# Patient Record
Sex: FEMALE | Race: WHITE | Employment: UNEMPLOYED | URBAN - METROPOLITAN AREA
[De-identification: names, ages, dates, MRNs, and addresses within clinical notes are randomized per-mention and may not be internally consistent; named-entity substitution may affect disease eponyms.]

---

## 2021-04-24 ENCOUNTER — HOSPITAL ENCOUNTER (INPATIENT)
Age: 81
LOS: 3 days | Discharge: NURSING, REHAB OR LONG-TERM CARE | DRG: 690 | End: 2021-04-27
Attending: STUDENT IN AN ORGANIZED HEALTH CARE EDUCATION/TRAINING PROGRAM | Admitting: NEUROMUSCULOSKELETAL MEDICINE & OMM
Payer: MEDICARE

## 2021-04-24 ENCOUNTER — APPOINTMENT (OUTPATIENT)
Dept: CT IMAGING | Age: 81
DRG: 690 | End: 2021-04-24
Payer: MEDICARE

## 2021-04-24 DIAGNOSIS — N30.00 ACUTE CYSTITIS WITHOUT HEMATURIA: ICD-10-CM

## 2021-04-24 DIAGNOSIS — I95.9 HYPOTENSION, UNSPECIFIED HYPOTENSION TYPE: ICD-10-CM

## 2021-04-24 DIAGNOSIS — R41.82 ALTERED MENTAL STATUS, UNSPECIFIED ALTERED MENTAL STATUS TYPE: Primary | ICD-10-CM

## 2021-04-24 PROBLEM — N30.90 CYSTITIS: Status: ACTIVE | Noted: 2021-04-24

## 2021-04-24 LAB
ALBUMIN SERPL-MCNC: 3.2 G/DL (ref 3.5–5.2)
ALP BLD-CCNC: 108 U/L (ref 35–104)
ALT SERPL-CCNC: 13 U/L (ref 0–32)
ANION GAP SERPL CALCULATED.3IONS-SCNC: 9 MMOL/L (ref 7–16)
AST SERPL-CCNC: 28 U/L (ref 0–31)
BACTERIA: ABNORMAL /HPF
BASOPHILS ABSOLUTE: 0.02 E9/L (ref 0–0.2)
BASOPHILS RELATIVE PERCENT: 0.4 % (ref 0–2)
BILIRUB SERPL-MCNC: <0.2 MG/DL (ref 0–1.2)
BILIRUBIN URINE: NEGATIVE
BLOOD, URINE: ABNORMAL
BUN BLDV-MCNC: 20 MG/DL (ref 6–23)
CALCIUM SERPL-MCNC: 8.6 MG/DL (ref 8.6–10.2)
CHLORIDE BLD-SCNC: 103 MMOL/L (ref 98–107)
CLARITY: ABNORMAL
CO2: 24 MMOL/L (ref 22–29)
COLOR: YELLOW
CREAT SERPL-MCNC: 0.7 MG/DL (ref 0.5–1)
EOSINOPHILS ABSOLUTE: 0.17 E9/L (ref 0.05–0.5)
EOSINOPHILS RELATIVE PERCENT: 3.3 % (ref 0–6)
EPITHELIAL CELLS, UA: ABNORMAL /HPF
GFR AFRICAN AMERICAN: >60
GFR NON-AFRICAN AMERICAN: >60 ML/MIN/1.73
GLUCOSE BLD-MCNC: 84 MG/DL (ref 74–99)
GLUCOSE URINE: NEGATIVE MG/DL
HCT VFR BLD CALC: 37.5 % (ref 34–48)
HEMOGLOBIN: 11.7 G/DL (ref 11.5–15.5)
IMMATURE GRANULOCYTES #: 0.01 E9/L
IMMATURE GRANULOCYTES %: 0.2 % (ref 0–5)
KETONES, URINE: NEGATIVE MG/DL
LEUKOCYTE ESTERASE, URINE: ABNORMAL
LYMPHOCYTES ABSOLUTE: 1.82 E9/L (ref 1.5–4)
LYMPHOCYTES RELATIVE PERCENT: 35.5 % (ref 20–42)
MCH RBC QN AUTO: 32.7 PG (ref 26–35)
MCHC RBC AUTO-ENTMCNC: 31.2 % (ref 32–34.5)
MCV RBC AUTO: 104.7 FL (ref 80–99.9)
MONOCYTES ABSOLUTE: 0.48 E9/L (ref 0.1–0.95)
MONOCYTES RELATIVE PERCENT: 9.4 % (ref 2–12)
NEUTROPHILS ABSOLUTE: 2.62 E9/L (ref 1.8–7.3)
NEUTROPHILS RELATIVE PERCENT: 51.2 % (ref 43–80)
NITRITE, URINE: NEGATIVE
PDW BLD-RTO: 14.5 FL (ref 11.5–15)
PH UA: 6 (ref 5–9)
PLATELET # BLD: 200 E9/L (ref 130–450)
PMV BLD AUTO: 12.2 FL (ref 7–12)
POTASSIUM SERPL-SCNC: 5.5 MMOL/L (ref 3.5–5)
PROCALCITONIN: 0.06 NG/ML (ref 0–0.08)
PROTEIN UA: 30 MG/DL
RBC # BLD: 3.58 E12/L (ref 3.5–5.5)
RBC UA: ABNORMAL /HPF (ref 0–2)
SODIUM BLD-SCNC: 136 MMOL/L (ref 132–146)
SPECIFIC GRAVITY UA: 1.01 (ref 1–1.03)
TOTAL PROTEIN: 6.7 G/DL (ref 6.4–8.3)
TROPONIN: <0.01 NG/ML (ref 0–0.03)
UROBILINOGEN, URINE: 0.2 E.U./DL
WBC # BLD: 5.1 E9/L (ref 4.5–11.5)
WBC UA: ABNORMAL /HPF (ref 0–5)

## 2021-04-24 PROCEDURE — 96360 HYDRATION IV INFUSION INIT: CPT

## 2021-04-24 PROCEDURE — 87088 URINE BACTERIA CULTURE: CPT

## 2021-04-24 PROCEDURE — 87040 BLOOD CULTURE FOR BACTERIA: CPT

## 2021-04-24 PROCEDURE — 87077 CULTURE AEROBIC IDENTIFY: CPT

## 2021-04-24 PROCEDURE — 36415 COLL VENOUS BLD VENIPUNCTURE: CPT

## 2021-04-24 PROCEDURE — 99284 EMERGENCY DEPT VISIT MOD MDM: CPT

## 2021-04-24 PROCEDURE — G0378 HOSPITAL OBSERVATION PER HR: HCPCS | Performed by: NEUROMUSCULOSKELETAL MEDICINE & OMM

## 2021-04-24 PROCEDURE — 85025 COMPLETE CBC W/AUTO DIFF WBC: CPT

## 2021-04-24 PROCEDURE — 2580000003 HC RX 258: Performed by: NEUROMUSCULOSKELETAL MEDICINE & OMM

## 2021-04-24 PROCEDURE — 6370000000 HC RX 637 (ALT 250 FOR IP): Performed by: NEUROMUSCULOSKELETAL MEDICINE & OMM

## 2021-04-24 PROCEDURE — 2580000003 HC RX 258: Performed by: STUDENT IN AN ORGANIZED HEALTH CARE EDUCATION/TRAINING PROGRAM

## 2021-04-24 PROCEDURE — 87186 SC STD MICRODIL/AGAR DIL: CPT

## 2021-04-24 PROCEDURE — 74176 CT ABD & PELVIS W/O CONTRAST: CPT

## 2021-04-24 PROCEDURE — 81001 URINALYSIS AUTO W/SCOPE: CPT

## 2021-04-24 PROCEDURE — G0378 HOSPITAL OBSERVATION PER HR: HCPCS

## 2021-04-24 PROCEDURE — 6360000002 HC RX W HCPCS: Performed by: STUDENT IN AN ORGANIZED HEALTH CARE EDUCATION/TRAINING PROGRAM

## 2021-04-24 PROCEDURE — 84484 ASSAY OF TROPONIN QUANT: CPT

## 2021-04-24 PROCEDURE — 96361 HYDRATE IV INFUSION ADD-ON: CPT

## 2021-04-24 PROCEDURE — 96374 THER/PROPH/DIAG INJ IV PUSH: CPT

## 2021-04-24 PROCEDURE — 93005 ELECTROCARDIOGRAM TRACING: CPT | Performed by: STUDENT IN AN ORGANIZED HEALTH CARE EDUCATION/TRAINING PROGRAM

## 2021-04-24 PROCEDURE — 96365 THER/PROPH/DIAG IV INF INIT: CPT

## 2021-04-24 PROCEDURE — 80053 COMPREHEN METABOLIC PANEL: CPT

## 2021-04-24 PROCEDURE — 84145 PROCALCITONIN (PCT): CPT

## 2021-04-24 RX ORDER — SODIUM CHLORIDE 9 MG/ML
INJECTION, SOLUTION INTRAVENOUS CONTINUOUS
Status: DISCONTINUED | OUTPATIENT
Start: 2021-04-24 | End: 2021-04-27 | Stop reason: HOSPADM

## 2021-04-24 RX ORDER — 0.9 % SODIUM CHLORIDE 0.9 %
500 INTRAVENOUS SOLUTION INTRAVENOUS ONCE
Status: COMPLETED | OUTPATIENT
Start: 2021-04-24 | End: 2021-04-24

## 2021-04-24 RX ORDER — ACETAMINOPHEN 650 MG/1
650 SUPPOSITORY RECTAL EVERY 4 HOURS PRN
Status: DISCONTINUED | OUTPATIENT
Start: 2021-04-24 | End: 2021-04-27 | Stop reason: HOSPADM

## 2021-04-24 RX ORDER — ACETAMINOPHEN 160 MG/5ML
650 SOLUTION ORAL EVERY 4 HOURS PRN
Status: DISCONTINUED | OUTPATIENT
Start: 2021-04-24 | End: 2021-04-27 | Stop reason: HOSPADM

## 2021-04-24 RX ORDER — CEFDINIR 300 MG/1
300 CAPSULE ORAL 2 TIMES DAILY
Qty: 20 CAPSULE | Refills: 0 | Status: SHIPPED | OUTPATIENT
Start: 2021-04-24 | End: 2021-05-04

## 2021-04-24 RX ORDER — 0.9 % SODIUM CHLORIDE 0.9 %
1000 INTRAVENOUS SOLUTION INTRAVENOUS ONCE
Status: COMPLETED | OUTPATIENT
Start: 2021-04-24 | End: 2021-04-24

## 2021-04-24 RX ORDER — MIRTAZAPINE 15 MG/1
15 TABLET, FILM COATED ORAL NIGHTLY
Status: DISCONTINUED | OUTPATIENT
Start: 2021-04-24 | End: 2021-04-27 | Stop reason: HOSPADM

## 2021-04-24 RX ORDER — ACETAMINOPHEN 160 MG/5ML
650 SOLUTION ORAL EVERY 4 HOURS PRN
COMMUNITY

## 2021-04-24 RX ORDER — CARBAMAZEPINE 100 MG/5ML
600 SUSPENSION ORAL 3 TIMES DAILY
COMMUNITY

## 2021-04-24 RX ORDER — MIRTAZAPINE 15 MG/1
15 TABLET, FILM COATED ORAL NIGHTLY
COMMUNITY

## 2021-04-24 RX ORDER — CARBAMAZEPINE 100 MG/5ML
600 SUSPENSION ORAL 3 TIMES DAILY
Status: DISCONTINUED | OUTPATIENT
Start: 2021-04-24 | End: 2021-04-27 | Stop reason: HOSPADM

## 2021-04-24 RX ORDER — ACETAMINOPHEN 650 MG/1
650 SUPPOSITORY RECTAL EVERY 4 HOURS PRN
COMMUNITY

## 2021-04-24 RX ORDER — BISACODYL 10 MG
10 SUPPOSITORY, RECTAL RECTAL DAILY PRN
Status: DISCONTINUED | OUTPATIENT
Start: 2021-04-24 | End: 2021-04-27 | Stop reason: HOSPADM

## 2021-04-24 RX ORDER — PRIMIDONE 50 MG/1
100 TABLET ORAL 3 TIMES DAILY
Status: DISCONTINUED | OUTPATIENT
Start: 2021-04-24 | End: 2021-04-27 | Stop reason: HOSPADM

## 2021-04-24 RX ORDER — BISACODYL 10 MG
10 SUPPOSITORY, RECTAL RECTAL DAILY PRN
COMMUNITY

## 2021-04-24 RX ADMIN — SODIUM CHLORIDE 500 ML: 9 INJECTION, SOLUTION INTRAVENOUS at 06:42

## 2021-04-24 RX ADMIN — SODIUM CHLORIDE 1000 ML: 9 INJECTION, SOLUTION INTRAVENOUS at 09:45

## 2021-04-24 RX ADMIN — PRIMIDONE 100 MG: 50 TABLET ORAL at 15:18

## 2021-04-24 RX ADMIN — CARBAMAZEPINE 600 MG: 200 SUSPENSION ORAL at 20:12

## 2021-04-24 RX ADMIN — SODIUM CHLORIDE: 9 INJECTION, SOLUTION INTRAVENOUS at 14:21

## 2021-04-24 RX ADMIN — MIRTAZAPINE 15 MG: 15 TABLET, FILM COATED ORAL at 20:11

## 2021-04-24 RX ADMIN — CARBAMAZEPINE 600 MG: 200 SUSPENSION ORAL at 15:18

## 2021-04-24 RX ADMIN — CEFTRIAXONE 1000 MG: 1 INJECTION, POWDER, FOR SOLUTION INTRAMUSCULAR; INTRAVENOUS at 10:57

## 2021-04-24 RX ADMIN — PRIMIDONE 100 MG: 50 TABLET ORAL at 20:11

## 2021-04-24 NOTE — ED NOTES
[facility] reports that she is not pt's nurse but patient is nonverbal and will pull away when touched. This morning aides were making rounds and found patient \"unresponsive\" for 4-5 minutes despite being sternal rubbed.   Facility physician wanted patient sent in for \"evaluation of possible stroke or seizure like activity\"     Will fax code status paperwork to us ([phone])     Neeta Dominguez RN  04/24/21 6480

## 2021-04-24 NOTE — ED PROVIDER NOTES
Patient is a 79-year-old female with history of cerebral palsy, previous CVA, epilepsy that presents emergency department for evaluation of altered mental status. Patient from nursing home. She reportedly had an unresponsive episode yesterday that lasted 4 to 5 minutes. Symptoms came on suddenly. They were constant and severe. They did resolve on their own without any treatment. They noted at that time that her blood pressure was low. Physician from nursing home decided sent patient in for evaluation of stroke vs seizure. Patient is noted to be a DNR CC. She is nonverbal and unable provide further history. The history is provided by the patient. Review of Systems   Unable to perform ROS: Patient nonverbal        Physical Exam  Vitals signs and nursing note reviewed. Constitutional:       General: She is not in acute distress. Appearance: She is not ill-appearing. HENT:      Head: Normocephalic and atraumatic. Mouth/Throat:      Mouth: Mucous membranes are moist.   Eyes:      Pupils: Pupils are equal, round, and reactive to light. Neck:      Musculoskeletal: No muscular tenderness. Cardiovascular:      Rate and Rhythm: Normal rate and regular rhythm. Pulses: Normal pulses. Pulmonary:      Effort: Pulmonary effort is normal.      Breath sounds: Normal breath sounds. No wheezing or rhonchi. Abdominal:      Palpations: Abdomen is soft. Tenderness: There is no abdominal tenderness. There is no guarding or rebound. Musculoskeletal:      Right lower leg: No edema. Left lower leg: No edema. Skin:     General: Skin is warm and dry. Neurological:      Mental Status: She is alert. Mental status is at baseline. GCS: GCS eye subscore is 4. GCS verbal subscore is 1. GCS motor subscore is 5. Comments: Patient arms and legs contracted which is baseline.           Procedures     MDM  Number of Diagnoses or Management Options  Acute cystitis without

## 2021-04-24 NOTE — CONSULTS
E.E.S. [Erythromycin]     Tagamet [Cimetidine]        Surgical History  Unable to obtain due to patient's cognitive condition     Social History  Unable to obtain due to patient's cognitive condition      Family Medical History  Unable to obtain due to patient's cognitive condition    Review of Systems:  Unable to obtain due to patient's cognitive condition    Physical Examination:  Vitals:    04/24/21 1015 04/24/21 1132 04/24/21 1215 04/24/21 1545   BP: (!) 84/57 97/69 105/88 (!) 116/58   Pulse: 64 61 77 72   Resp: 18 18 18 18   Temp: 97.9 °F (36.6 °C)  98.7 °F (37.1 °C) 98.3 °F (36.8 °C)   TempSrc:   Temporal Temporal   SpO2: 94% 96% 98% 97%   Weight:   104 lb (47.2 kg)    Height:   5' (1.524 m)      Constitutional: Awake, not in distress  Eyes: Sclerae anicteric, no conjunctival erythema  ENT: No buccal lesion  Neck: No nuchal rigidity, no cervical adenopathy  Lungs: Clear breath sounds, no crackles, no wheezes  Heart: Regular rate and rhythm, no murmurs  Abdomen: Bowel sounds present, soft, nontender  Skin: Warm and dry, no active dermatoses  Musculoskeletal: No joint erythema, no joint swelling    Labs, imaging, and medical records/notes were personally reviewed. Assessment:  Hypotension, etiology unclear  Bacteriuria    Plan:  Continue ceftriaxone 1g q24h for now. Check blood cultures. Check CT abdomen and pelvis. Follow up urine culture. Check procalcitonin. Continue supportive care. Thank you for involving me in the care of NADEEN HOLGUIN. I will continue to follow. Please do not hesitate to call for any questions or concerns.     Electronically signed by Harjinder Rodriguez MD on 4/24/2021 at 5:39 PM

## 2021-04-24 NOTE — LETTER
[place] Department Medicaid  CERTIFICATION OF NECESSITY  FOR NON-EMERGENCY TRANSPORTATION   BY GROUND AMBULANCE      Individual Information   1. Name: Adeel Lofton 2. [place] Medicaid Billing Number:    3. Address: [facility] [address]      Transportation Provider Information   4. Provider Name:    5. [place] Medicaid Provider Number:  National Provider Identifier (NPI):      Certification  7. Criteria:  During transport, this individual requires:  [x] Medical treatment or continuous     supervision by an EMT. [] The administration or regulation of oxygen by another person. [] Supervised protective restraint. 8. Period Beginning Date: 2021   9. Length  [x] Not more than 7 day(s)  [] One Year     Additional Information Relevant to Certification   10. Comments or Explanations, If Necessary or Appropriate   CEREBRAL PALSY, HIGH FALL RISK, CONTRACTURES     Certifying Practitioner Information   11. Name of Practitioner: DR. Dominique HILL MD   12. [place] Medicaid Provider Number, If Applicable:  [address] Provider Identifier (NPI):      Signature Information   14. Date of Signature: 2021   15. Name of Person Signing: Irlanda Wilson   16. Signature and Professional Designation: Electronically signed by RUTH Bender on 2021 at 2:12 PM       OD 78233  Rev. 2015       [address] [place] [address] Encounter Date/Time: 2021 Amberly Dill 336 Account: [de-identified]    MRN: [id number]    Patient: Adeel Lofton    Contact Serial #: [id number]      ENCOUNTER          Patient Class: I Private Enc?   No Unit  BD: [facility] Service: Med/Surg   Encounter DX: Acute cystitis without h*   ADM Provider: Kamari Fraser MD   Procedure:     ATT Provider: Kamari Fraser MD   REF Provider:        Admission DX: Acute cystitis without hematuria, Cystitis and codes: 595.0, 595.9      PATIENT                 Name: Adeel Lofton : 1940 (81 yrs)   Address: [address] [place] [address] Sex: Female   [place] [address]         Marital Status: Single   Employer: NONE         Shinto:     Primary Care Provider:           Primary Phone: [phone]   EMERGENCY CONTACT   Contact Name Legal Guardian? Relationship to Patient Home Phone Work Phone   1. Divina lowerylarryeloisa Mayen  2. Carlton Starkey No  No Legal Guardian  Brother/Sister [phone]              GUARANTOR            Guarantor: Tammy Arteaga     : 1940   Address: [address]* Sex: Female   [place] [address]     Relation to Patient: Self       Home Phone: [phone]   Guarantor ID: [id number]       Work Phone:     Guarantor Employer: NONE         Status: NOT EMPLO*      COVERAGE        PRIMARY INSURANCE   Payor: Wes Gamma* Plan: Ulises BARROS*   Payor Address: [facility], [address] [facility]       Group Number: [id number] Insurance Type: INDEMNITY   Subscriber Name: Josh Santillan : 1940   Subscriber ID: [id number] Pat. Rel. to Sub: Self   SECONDARY INSURANCE   Payor:   Plan:     Payor Address:  ,           Group Number:   Insurance Type:     Subscriber Name:   Subscriber :     Subscriber ID:   Pat.  Rel. to Sub:

## 2021-04-24 NOTE — PLAN OF CARE
Problem: Falls - Risk of:  Goal: Will remain free from falls  Description: Will remain free from falls  4/24/2021 1556 by Janice Cortez  Outcome: Met This Shift  4/24/2021 1310 by Janice Cortez  Outcome: Met This Shift  Goal: Absence of physical injury  Description: Absence of physical injury  4/24/2021 1556 by Janice Cortez  Outcome: Met This Shift  4/24/2021 1310 by Janice Cortez  Outcome: Met This Shift

## 2021-04-25 LAB
ALBUMIN SERPL-MCNC: 2.6 G/DL (ref 3.5–5.2)
ALP BLD-CCNC: 90 U/L (ref 35–104)
ALT SERPL-CCNC: 7 U/L (ref 0–32)
ANION GAP SERPL CALCULATED.3IONS-SCNC: 9 MMOL/L (ref 7–16)
AST SERPL-CCNC: 12 U/L (ref 0–31)
BILIRUB SERPL-MCNC: <0.2 MG/DL (ref 0–1.2)
BUN BLDV-MCNC: 13 MG/DL (ref 6–23)
CALCIUM SERPL-MCNC: 7.6 MG/DL (ref 8.6–10.2)
CHLORIDE BLD-SCNC: 108 MMOL/L (ref 98–107)
CO2: 26 MMOL/L (ref 22–29)
CREAT SERPL-MCNC: 0.6 MG/DL (ref 0.5–1)
EKG ATRIAL RATE: 73 BPM
EKG Q-T INTERVAL: 402 MS
EKG QRS DURATION: 86 MS
EKG QTC CALCULATION (BAZETT): 442 MS
EKG R AXIS: 19 DEGREES
EKG T AXIS: 87 DEGREES
EKG VENTRICULAR RATE: 73 BPM
GFR AFRICAN AMERICAN: >60
GFR NON-AFRICAN AMERICAN: >60 ML/MIN/1.73
GLUCOSE BLD-MCNC: 105 MG/DL (ref 74–99)
POTASSIUM SERPL-SCNC: 4.2 MMOL/L (ref 3.5–5)
SODIUM BLD-SCNC: 143 MMOL/L (ref 132–146)
TOTAL PROTEIN: 5.2 G/DL (ref 6.4–8.3)

## 2021-04-25 PROCEDURE — 96376 TX/PRO/DX INJ SAME DRUG ADON: CPT

## 2021-04-25 PROCEDURE — 6360000002 HC RX W HCPCS: Performed by: NEUROMUSCULOSKELETAL MEDICINE & OMM

## 2021-04-25 PROCEDURE — 1200000000 HC SEMI PRIVATE

## 2021-04-25 PROCEDURE — 36415 COLL VENOUS BLD VENIPUNCTURE: CPT

## 2021-04-25 PROCEDURE — 6370000000 HC RX 637 (ALT 250 FOR IP): Performed by: NEUROMUSCULOSKELETAL MEDICINE & OMM

## 2021-04-25 PROCEDURE — 2580000003 HC RX 258: Performed by: NEUROMUSCULOSKELETAL MEDICINE & OMM

## 2021-04-25 PROCEDURE — 93010 ELECTROCARDIOGRAM REPORT: CPT | Performed by: INTERNAL MEDICINE

## 2021-04-25 PROCEDURE — 80053 COMPREHEN METABOLIC PANEL: CPT

## 2021-04-25 PROCEDURE — 99222 1ST HOSP IP/OBS MODERATE 55: CPT | Performed by: INTERNAL MEDICINE

## 2021-04-25 RX ORDER — 0.9 % SODIUM CHLORIDE 0.9 %
250 INTRAVENOUS SOLUTION INTRAVENOUS ONCE
Status: DISCONTINUED | OUTPATIENT
Start: 2021-04-25 | End: 2021-04-27 | Stop reason: HOSPADM

## 2021-04-25 RX ADMIN — CARBAMAZEPINE 600 MG: 200 SUSPENSION ORAL at 14:43

## 2021-04-25 RX ADMIN — CARBAMAZEPINE 600 MG: 200 SUSPENSION ORAL at 08:40

## 2021-04-25 RX ADMIN — PRIMIDONE 100 MG: 50 TABLET ORAL at 14:43

## 2021-04-25 RX ADMIN — CEFTRIAXONE 1000 MG: 1 INJECTION, POWDER, FOR SOLUTION INTRAMUSCULAR; INTRAVENOUS at 12:22

## 2021-04-25 RX ADMIN — PRIMIDONE 100 MG: 50 TABLET ORAL at 08:40

## 2021-04-25 ASSESSMENT — PAIN SCALES - WONG BAKER
WONGBAKER_NUMERICALRESPONSE: 0
WONGBAKER_NUMERICALRESPONSE: 0

## 2021-04-25 NOTE — H&P
[address]                  [address] [facility]                              HISTORY AND PHYSICAL    PATIENT NAME: Vincent Gomez                      :        1940  MED REC NO:   [id number]                            ROOM:       5414  ACCOUNT NO:   [de-identified]                           ADMIT DATE: 2021  PROVIDER:     Taty Mcduffie DO    CHIEF COMPLAINT:  Altered mental status, hypotension. HISTORY OF CHIEF COMPLAINT:  This is a 77-year-old female with history  of cerebral palsy, previous CVA, epilepsy, presents to the emergency  room due to altered mental status. The patient is from a nursing home  setting. She is a DNR-CC status. She reportedly had an unresponsive  episode that lasts about 4 to 5 minutes. Symptoms came on suddenly. They were constant. They did resolve on their own without any  treatment. It was noted at that time her blood pressure was low. Nursing home sent her in for evaluation of stroke versus seizure. The  patient is nonverbal and unable to provide any further history. Workup in the ER showed white count 5.1. She was afebrile. Electrolytes unremarkable except for potassium of 5.5. Her albumin was  3.2, alk phos 108. H and H 11.7 and 37.5. Urinalysis shows small  blood, large leukocytes, negative nitrites, packed WBCs, many bacteria,  10 to 20 RBCs. She was given, I believe, IV antibiotic, it was not  documented though. The family wishes the patient to stay in the  hospital for dehydration and UTI. Her urinalysis showed large  leukocytes as well as packed WBCs. Currently, she is awake and alert,  nonverbal.  She offers no other complaints at this time. PAST MEDICAL HISTORY:  1. Cerebral palsy. 2.  Epilepsy. 3.  Depression. 4.  Arthritis. PAST SURGICAL HISTORY:  None noted. ALLERGIES:  She has two allergies:  1. ERYTHROMYCIN. 2.  TAGAMET.     MEDICATIONS PRIOR TO ADMISSION:  She was on,  1. Tylenol 650 one every four hours as needed for fever and pain. 2.  Tegretol 600 mg one by mouth three times a day. 3.  Dulcolax 10 mg per rectum daily as needed. 4.  Milk of Magnesia 30 mL by mouth daily as needed. 5.  Remeron 15 mg one by mouth at bedtime. 6.  Primidone 100 mg one by mouth three times a day. SOCIAL HISTORY:  Nondrinker, nonsmoker. No history of illicit drug use. The patient does not vape. Occupation, she is on disability. She is  single, has no children. She is a resident of nursing home facility. FAMILY HISTORY:  Unknown. REVIEW OF SYSTEMS:  As mentioned in chief complaint, otherwise  unremarkable. PHYSICAL EXAMINATION:  VITAL SIGNS:  On admission, temperature 97.4, pulse 63, respirations 16,  blood pressure 119/72, initially pulse ox 99% on room air. Height 5  feet, weight 104 pounds, BMI is 20.31. GENERAL:  Alert and awake, nonverbal.  HEENT:  Head:  Normocephalic, atraumatic. Eyes:  Pupils are equal and  reactive to light. Throat:  Mild postnasal drainage. NECK:  Supple. No adenopathy, no bruit. HEART:  Regular rate and rhythm without murmurs, rubs or gallops. LUNGS:  Clear to auscultation bilaterally. ABDOMEN:  Soft, nontender, nondistended. Good bowel sounds throughout. No masses, no organomegaly, no bruit. EXTREMITIES:  She is contracted, but without clubbing, cyanosis or  edema. NEUROLOGICAL:  Could not test due to the patient's medical condition of  cerebral palsy. MUSCULOSKELETAL:  The patient's bilaterally upper and lower extremities  are contracted. Muscle strength about a +1 to 2 over 6 in the upper and  lower extremities. LABORATORY DATA:  On admission, metabolic panel unremarkable except for  potassium 5.5. Troponin less than 0.01. Albumin 3.2, alk phos 108. CBC:  White count 5.1, platelets 993, H and H 11.7 and 37.5. DIAGNOSTIC DATA:  CT scan of the abdomen and pelvis was ordered, but was  not done. IMPRESSION:  1. Acute cystitis/UTI. 2.  Altered mental status secondary to acute cystitis/UTI. 3.  Dehydration. 4.  Unresponsiveness with hypotension likely due to dehydration and  cystitis. 5.  Cerebral palsy. 6.  History of epilepsy. 7.  History of CVA. 8.  The patient's DNR-CC status. PLAN:  The patient is admitted under Dr. Kyaw Price service to general  medical floor. We will start IV fluids and IV antibiotics of Rocephin 1  gm daily. We will obtain an ID consult for antibiotic management and  obtain Cardiology consult for her unresponsiveness and hypotension. Await further recommendations per consultants and treat accordingly. Disposition back to skilled nursing facility when medically stable.         Vianey 67, DO    D: 04/25/2021 7:57:10       T: 04/25/2021 7:59:59     [email or web address]  Job#: [id number]     Doc#: [id number]    CC:

## 2021-04-25 NOTE — CONSULTS
Kelsey Dennis  1940  Date of Service: 4/25/2021    Reason for Consultation: We were asked to see Kelsey Dennis by Dr. Junito Rojo primary care provider on file. regarding hypotension. History of Chief Complaint: This is a 80 y.o. female with a history of cerebral palsy, epilepsy, and depression. .  She is reclining nearly flat in bed. She is sleeping comfortably. She awakens easily. She looks around and moves all 4 extremities but she does not communicate. She is in no distress. Per review of the chart the nurse in the emergency room spoke with the nurse at her nursing home. Reportedly her baseline is that she is nonverbal and will pull away when touched. To me today, as described above it appears that she is about at her baseline. Reportedly yesterday at the nursing home she did not respond to sternal rub for about 45 minutes. She has been diagnosed with a urinary tract infection. REVIEW OF SYSTEMS:   Unobtainable.     CURRENT MEDICATIONS:  Current Facility-Administered Medications   Medication Dose Route Frequency Provider Last Rate Last Admin    perflutren lipid microspheres (DEFINITY) injection 1.65 mg  1.5 mL Intravenous ONCE PRN Yovani Arredondo, DO        acetaminophen (TYLENOL) 160 MG/5ML solution 650 mg  650 mg Oral Q4H PRN Joao Sam, DO        acetaminophen (TYLENOL) suppository 650 mg  650 mg Rectal Q4H PRN Joao Sam, DO        bisacodyl (DULCOLAX) suppository 10 mg  10 mg Rectal Daily PRN Joao Sam, DO        carBAMazepine (TEGRETOL) 100 MG/5ML suspension 600 mg  600 mg Oral TID Lenny Sam DO   600 mg at 04/25/21 0840    magnesium hydroxide (MILK OF MAGNESIA) 400 MG/5ML suspension 30 mL  30 mL Oral Daily PRN Joao Sam, DO        mirtazapine (REMERON) tablet 15 mg  15 mg Oral Nightly Joao Sam, DO   15 mg at 04/24/21 2011    primidone (MYSOLINE) tablet 100 mg  100 mg Oral TID Lenny Sam, DO   100 mg at 04/25/21 0840    0.9 % sodium chloride infusion   Intravenous Continuous Grover Johns DO Flaco 75 mL/hr at 04/24/21 1421 New Bag at 04/24/21 1421    cefTRIAXone (ROCEPHIN) 1,000 mg in sterile water 10 mL IV syringe  1,000 mg Intravenous Q24H Joao Sam DO            ALLERGIES:  Allergies   Allergen Reactions    E.E.S. [Erythromycin]     Tagamet [Cimetidine]        MEDICAL HISTORY:  Past Medical History:   Diagnosis Date    Arthritis     [facility] ([facility] [address]     Depression     Epilepsy ([facility]        SURGICAL HISTORY:  History reviewed. No pertinent surgical history. FAMILY HISTORY:  History reviewed. No pertinent family history.     SOCIAL HISTORY:  Social History     Socioeconomic History    Marital status: Single     Spouse name: None    Number of children: None    Years of education: None    Highest education level: None   Occupational History    None   Social Needs    Financial resource strain: None    Food insecurity     Worry: None     Inability: None    Transportation needs     Medical: None     Non-medical: None   Tobacco Use    Smoking status: None   Substance and Sexual Activity    Alcohol use: None    Drug use: None    Sexual activity: None   Lifestyle    Physical activity     Days per week: None     Minutes per session: None    Stress: None   Relationships    Social connections     Talks on phone: None     Gets together: None     Attends Protestant service: None     Active member of club or organization: None     Attends meetings of clubs or organizations: None     Relationship status: None    Intimate partner violence     Fear of current or ex partner: None     Emotionally abused: None     Physically abused: None     Forced sexual activity: None   Other Topics Concern    None   Social History Narrative    None       PHYSICAL EXAM:  Vitals:    04/24/21 1215 04/24/21 1545 04/25/21 0405 04/25/21 0845   BP: 105/88 (!) 116/58  (!) 74/48   Pulse: 77 72  58   Resp: 18 18 14 17 Temp: 98.7 °F (37.1 °C) 98.3 °F (36.8 °C) 98.4 °F (36.9 °C) 98 °F (36.7 °C)   TempSrc: Temporal Temporal Infrared Temporal   SpO2: 98% 97% 95% 98%   Weight: 104 lb (47.2 kg)      Height: 5' (1.524 m)          GENERAL: As above  NECK:  No masses, trachea is mid position. Supple, full ROM, no JVD or bruits. No palpable thyromegaly or lymphadenopathy. HEART:  Regular rate and rhythm. Normal S1 and S2. There is an S4 gallop and no abnormal murmurs. No heaves. LUNGS:  Clear to auscultation bilaterally. No use of accessory muscles. Not following instructions. Very poor effort. ABDOMEN:  Soft, non-tender. Normal bowel sounds. EXTREMITIES: Her right hand is contracted more than the left. Full ROM x4. No bilateral lower extremity edema. Good distal pulses. EYES: Difficult exam but PERRL, normal lids & conjunctiva. No icterus. ENT: no external masses, no bleeding. Moist mucosa. Normal lips formation. NEURO: Full ROM x 4, EOMI, no tremors. Right hand is contracted more than left. SKIN:  Warm, dry and intact. Normal turgor, no petechia. Phych: As above. .  Currently not agitated or anxious. EKG: Baseline artifact but sinus rhythm, 73 bpm, nl axis, nonspecific ST - T wave changes. Incomplete right bundle branch block. Labs:  Recent Labs     04/24/21  0639   WBC 5.1   HGB 11.7   HCT 37.5   .7*        Recent Labs     04/24/21  0639      K 5.5*      CO2 24   BUN 20   CREATININE 0.7     No results for input(s): PROTIME, INR in the last 72 hours. Recent Labs     04/24/21  0639   TROPONINI <0.01     No results for input(s): BNP in the last 72 hours. No results for input(s): CHOL, HDL, TRIG in the last 72 hours. Invalid input(s): CHOLHDLR, LDLCALCU    Assessment:   1. Reportedly unresponsive or less responsive at the nursing home yesterday. She now appears to be at her baseline. 2. Low blood pressure. 3. She is not on telemetry.   4. Urinary tract infection  5. Cerebral palsy  6. Epilepsy  7. Depression      Recommendations:  1. DNR-CC. 2. Echo. 3. Conservative management. Thank you for allowing me to participate in your patient's care. [address] [facility], [address] [facility]  Interventional Cardiology    Note: This report was completed using computerized voice recognition software. Every effort has been made to ensure accuracy, however; and invert and computerized transcription errors may be present.

## 2021-04-25 NOTE — PROGRESS NOTES
BETSY PROGRESS NOTE      Chief complaint: Follow-up of concern for UTI    The patient is a 80 y.o. female nursing home resident with history of cerebral palsy, epilepsy, presented on 04/24 with concern for stroke versus seizure after an episode of unresponsiveness lasting 45 minutes 1 day prior to admission. On admission, she was afebrile, hypotensive with no leukocytosis. Urinalysis showed packed WBCs with urine culture showing 75,000 CFU/mL of Proteus mirabilis. Procalcitonin was not elevated at 0.06 ng/mL. Ceftriaxone was started. Subjective: Patient was seen and examined. She is not communicating. According to her sister, she is eating well and is back to her baseline. Objective:    Vitals:    04/25/21 0845   BP: (!) 74/48   Pulse: 58   Resp: 17   Temp: 98 °F (36.7 °C)   SpO2: 98%     Constitutional: Awake, not in distress  Respiratory: Clear breath sounds, no crackles, no wheezes  Cardiovascular: Regular rate and rhythm, no murmurs  Gastrointestinal: Bowel sounds present, soft, nontender  Skin: Warm and dry, no active dermatoses  Musculoskeletal: No joint swelling, no joint erythema    Labs, imaging, and medical records/notes were personally reviewed. Assessment:  Hypotension, etiology unclear  Asymptomatic bacteriuria    Recommendations:  Stop ceftriaxone. Monitor clinically off antibiotics for now. Follow up blood cultures. Continue supportive care.     Thank you for involving me in the care of NADEEN HOLGUIN. I will continue to follow. Please do not hesitate to call for any questions or concerns.     Electronically signed by Cee Santiago MD on 4/25/2021 at 11:22 AM

## 2021-04-26 PROCEDURE — 99231 SBSQ HOSP IP/OBS SF/LOW 25: CPT | Performed by: INTERNAL MEDICINE

## 2021-04-26 PROCEDURE — 1200000000 HC SEMI PRIVATE

## 2021-04-26 PROCEDURE — 6370000000 HC RX 637 (ALT 250 FOR IP): Performed by: NEUROMUSCULOSKELETAL MEDICINE & OMM

## 2021-04-26 RX ADMIN — MIRTAZAPINE 15 MG: 15 TABLET, FILM COATED ORAL at 20:55

## 2021-04-26 RX ADMIN — PRIMIDONE 100 MG: 50 TABLET ORAL at 01:03

## 2021-04-26 RX ADMIN — PRIMIDONE 100 MG: 50 TABLET ORAL at 17:18

## 2021-04-26 RX ADMIN — CARBAMAZEPINE 600 MG: 200 SUSPENSION ORAL at 01:03

## 2021-04-26 RX ADMIN — PRIMIDONE 100 MG: 50 TABLET ORAL at 10:46

## 2021-04-26 RX ADMIN — CARBAMAZEPINE 600 MG: 200 SUSPENSION ORAL at 17:18

## 2021-04-26 RX ADMIN — MIRTAZAPINE 15 MG: 15 TABLET, FILM COATED ORAL at 01:03

## 2021-04-26 RX ADMIN — PRIMIDONE 100 MG: 50 TABLET ORAL at 20:55

## 2021-04-26 RX ADMIN — CARBAMAZEPINE 600 MG: 200 SUSPENSION ORAL at 10:46

## 2021-04-26 RX ADMIN — CARBAMAZEPINE 600 MG: 200 SUSPENSION ORAL at 20:55

## 2021-04-26 ASSESSMENT — PAIN SCALES - WONG BAKER: WONGBAKER_NUMERICALRESPONSE: 0

## 2021-04-26 NOTE — CONSULTS
4/26/2021 3:39 PM  Service: Urology  Group: [facility] urology (Michael/Feroz/Kaitlin)    Demi Whatley  [id number]     Chief Complaint:    Left Renal Pelvis Stone     History of Present Illness: The patient is a 80 y.o. female patient with a history of cerebral palsy and epilepsy who presented to the hospital 2 days ago for evaluation of altered mental status and hypotension. She is currently being treated for urinary tract infection by infectious disease. She has CT abdomen pelvis performed that showed an 8 mm left renal pelvic calculi with additional 15 mm left renal calculi. Creatinine stable. She has no fevers or leukocytosis. Urine culture is 75,000 CFU/ml of Proteus Mirabilis and she is being followed by infectious disease. Past Medical History:   Diagnosis Date    Arthritis     Cerebral pals[facility] ([facility] [address]     Depression     Epilepsy ([facility] [address]          History reviewed. No pertinent surgical history. Medications Prior to Admission:    Medications Prior to Admission: acetaminophen (TYLENOL) 160 MG/5ML solution, Take 650 mg by mouth every 4 hours as needed for Fever or Pain  acetaminophen (TYLENOL) 650 MG suppository, Place 650 mg rectally every 4 hours as needed for Fever  carBAMazepine (TEGRETOL) 100 MG/5ML suspension, Take 600 mg by mouth 3 times daily  bisacodyl (DULCOLAX) 10 MG suppository, Place 10 mg rectally daily as needed for Constipation  magnesium hydroxide (MILK OF MAGNESIA) 400 MG/5ML suspension, Take 30 mLs by mouth daily as needed for Constipation  mirtazapine (REMERON) 15 MG tablet, Take 15 mg by mouth nightly  PRIMIDONE PO, Take 100 mg by mouth 3 times daily Patient takes 2mL of a 250 mg/5mL suspension three times daily with meals    Allergies:    E.e.s. [erythromycin] and Tagamet [cimetidine]    Social History:        Family History:   Non-contributory to this Urological problem  family history is not on file.     Review of Systems:  Unable to obtain, nonverbal    Physical Exam: Vitals:  BP (!) 113/53   Pulse 61   Temp 97.7 °F (36.5 °C) (Temporal)   Resp 16   Ht 5' (1.524 m) Comment: per home  Wt 104 lb (47.2 kg) Comment: per home 4/19/21  SpO2 92%   BMI 20.31 kg/m²     General:  Awake, nonverbal, does not follow commands, no distress   HEENT:  Normocephalic, atraumatic. Lungs:  Respirations symmetric and non-labored. Abdomen:  soft, nontender, no masses  Extremities:  Contracted   Skin:  Warm and dry, no open lesions or rashes  Neuro: There are no motor or sensory deficits in the 4 quadrant extremities   Rectal: deferred  Genitourinary:  No garcia     Labs:     Recent Labs     04/24/21  0639   WBC 5.1   RBC 3.58   HGB 11.7   HCT 37.5   .7*   MCH 32.7   MCHC 31.2*   RDW 14.5      MPV 12.2*         Recent Labs     04/24/21  0639 04/25/21  1402   CREATININE 0.7 0.6       Imaging:   Narrative   EXAMINATION:   CT OF THE ABDOMEN AND PELVIS WITHOUT CONTRAST 4/24/2021 5:40 pm       TECHNIQUE:   CT of the abdomen and pelvis was performed without the administration of   intravenous contrast. Multiplanar reformatted images are provided for review.    Dose modulation, iterative reconstruction, and/or weight based adjustment of   the mA/kV was utilized to reduce the radiation dose to as low as reasonably   achievable.       COMPARISON:   None.       HISTORY:   ORDERING SYSTEM PROVIDED HISTORY: hypotension, pyuria, check for   intraabdominal etiology   TECHNOLOGIST PROVIDED HISTORY:   Reason for exam:->hypotension, pyuria, check for intraabdominal etiology   Additional Contrast?->None   What reading provider will be dictating this exam?->CRC       FINDINGS:   There is a calculus located in left renal pelvis which measures approximately   8 mm with peripelvic edema.  Additional left-sided renal calculi are present   measuring up to 15 mm.  No evidence of ureteral calculus.  Right kidney is   grossly unremarkable.  There is no bowel obstruction, free air, or free   fluid.  The

## 2021-04-26 NOTE — PROGRESS NOTES
Admit Date: 4/24/2021    Subjective: All event reviewed   Awake comfortable non verbal   Back to her baseline     Objective:     Patient Vitals for the past 8 hrs:   BP Temp Temp src Pulse Resp SpO2   04/26/21 0353 (!) 113/53 98.6 °F (37 °C) Infrared 53 16 93 %     I/O last 3 completed shifts: In: 0566 [P.O.:840; I.V.:700]  Out: -   No intake/output data recorded. HEENT: Normal  NECK: Thyroid normal. No carotid bruit. No lymphphadenopathy. CVS: RRR  RS: Clear. No wheeze. No rhonchi. Good airflow bilaterally. ABD: Soft. Non tender. No mass. Normal BS. EXT: No edema. Non tender. Pulses present. Scheduled Meds:   sodium chloride  250 mL Intravenous Once    carBAMazepine  600 mg Oral TID    mirtazapine  15 mg Oral Nightly    primidone  100 mg Oral TID     Continuous Infusions:   sodium chloride 75 mL/hr at 04/24/21 1421       CBC with Differential:    Lab Results   Component Value Date    WBC 5.1 04/24/2021    RBC 3.58 04/24/2021    HGB 11.7 04/24/2021    HCT 37.5 04/24/2021     04/24/2021    .7 04/24/2021    MCH 32.7 04/24/2021    MCHC 31.2 04/24/2021    RDW 14.5 04/24/2021    LYMPHOPCT 35.5 04/24/2021    MONOPCT 9.4 04/24/2021    BASOPCT 0.4 04/24/2021    MONOSABS 0.48 04/24/2021    LYMPHSABS 1.82 04/24/2021    EOSABS 0.17 04/24/2021    BASOSABS 0.02 04/24/2021     CMP:    Lab Results   Component Value Date     04/25/2021    K 4.2 04/25/2021     04/25/2021    CO2 26 04/25/2021    BUN 13 04/25/2021    CREATININE 0.6 04/25/2021    GFRAA >60 04/25/2021    LABGLOM >60 04/25/2021    PROT 5.2 04/25/2021    LABALBU 2.6 04/25/2021    CALCIUM 7.6 04/25/2021    BILITOT <0.2 04/25/2021    ALKPHOS 90 04/25/2021    AST 12 04/25/2021    ALT 7 04/25/2021     PT/INR:  No results found for: PROTIME, INR    Assessment:     Active Problems:    Bacteriuria     Cerebral palsy    AMS    Plan:   Stable transfer when cleared by ID.

## 2021-04-26 NOTE — CARE COORDINATION
4/26/2021 social work transition of care planning  Sw met with pt's sister at bedside. Pt is from [address] [place] [address], and plan is to return  at discharge. Pt is long term care resident, bed hold,no auth or covid test needed. Envelope with ambulance form in soft chart.   Electronically signed by RUTH Franco on 4/26/2021 at 2:02 PM

## 2021-04-26 NOTE — PROGRESS NOTES
PROGRESS NOTE     CARDIOLOGY    Chief complaint: Seen today for follow up, management & recommendations for hypotension    She she opens her eyes and looks around. She moves all extremities. Nothing is purposeful. She does not communicate. .    Wt Readings from Last 3 Encounters:   04/24/21 104 lb (47.2 kg)     Temp Readings from Last 3 Encounters:   04/26/21 97.7 °F (36.5 °C) (Temporal)     BP Readings from Last 3 Encounters:   04/26/21 (!) 113/53     Pulse Readings from Last 3 Encounters:   04/26/21 61         Intake/Output Summary (Last 24 hours) at 4/26/2021 1001  Last data filed at 4/25/2021 1936  Gross per 24 hour   Intake 1180 ml   Output --   Net 1180 ml       Recent Labs     04/24/21  0639   WBC 5.1   HGB 11.7   HCT 37.5   .7*        Recent Labs     04/24/21  0639 04/25/21  1402    143   K 5.5* 4.2    108*   CO2 24 26   BUN 20 13   CREATININE 0.7 0.6     No results for input(s): PROTIME, INR in the last 72 hours. Recent Labs     04/24/21  0639   TROPONINI <0.01     No results for input(s): BNP in the last 72 hours. No results for input(s): CHOL, HDL, TRIG in the last 72 hours. Invalid input(s): CHOLHDLR, LDLCALCU      perflutren lipid microspheres (DEFINITY) injection 1.65 mg, ONCE PRN  0.9 % sodium chloride bolus, Once  acetaminophen (TYLENOL) 160 MG/5ML solution 650 mg, Q4H PRN  acetaminophen (TYLENOL) suppository 650 mg, Q4H PRN  bisacodyl (DULCOLAX) suppository 10 mg, Daily PRN  carBAMazepine (TEGRETOL) 100 MG/5ML suspension 600 mg, TID  magnesium hydroxide (MILK OF MAGNESIA) 400 MG/5ML suspension 30 mL, Daily PRN  mirtazapine (REMERON) tablet 15 mg, Nightly  primidone (MYSOLINE) tablet 100 mg, TID  0.9 % sodium chloride infusion, Continuous        Review of systems:     Unobtainable      Physical exam:    Constitutional: As above, no acute distress. Eyes: extraocular muscles intact, PERRL. Normal lids & conjunctiva. No icterus. ENT: clear, no bleeding.   No

## 2021-04-26 NOTE — DISCHARGE INSTR - COC
Continuity of Care Form    Patient Name: Shoshana Scanlon   :  1940  MRN:  [id number]    Admit date:  2021  Discharge date:  2021    Code Status Order: [facility]   Advance Directives:   [facility] Documentation       Date/Time Healthcare Directive Type of Healthcare Directive Copy in [facility] [address] Agent's Name Healthcare Agent's Phone Number    21 [id number]  Unknown, patient unable to respond due to medical condition  --  --  --  --  --            Admitting Physician:  Alfred Taylor MD  PCP: No primary care provider on file. Discharging Nurse: [facility] Unit/Room#: 0917/1524-N  Discharging Unit Phone Number: [phone]    Emergency Contact:   Extended Emergency Contact Information  Primary Emergency Contact: [facility] Phone: [phone]  Mobile Phone: [phone]  Relation: Legal Guardian  Preferred language: English   needed? No  Secondary Emergency Contact: [address] [place] [address], [facility] Phone: [phone]  Relation: Brother/Sister  Preferred language: English   needed? No    Past Surgical History:  History reviewed. No pertinent surgical history. Immunization History: There is no immunization history on file for this patient.     Active Problems:  Patient Active Problem List   Diagnosis Code    Acute cystitis without hematuria N30.00    Cystitis N30.90       Isolation/Infection:   Isolation            No Isolation          Patient Infection Status       None to display            Nurse Assessment:  Last Vital Signs: BP (!) 113/53   Pulse 53   Temp 98.6 °F (37 °C) (Infrared)   Resp 16   Ht 5' (1.524 m) Comment: per home  Wt 104 lb (47.2 kg) Comment: per home 21  SpO2 93%   BMI 20.31 kg/m²     Last documented pain score (0-10 scale):    Last Weight:   Wt Readings from Last 1 Encounters:   21 104 lb (47.2 kg)     Mental Status:  disoriented    IV Access:  - None    Nursing Mobility/ADLs:  Walking   Dependent  Transfer  Dependent  Bathing  Dependent  Dressing  Dependent  Toileting  Dependent  Feeding  Dependent  Med Admin  Dependent  Med Delivery   crushed and prefers mixed with coffee     Wound Care Documentation and Therapy:        Elimination:  Continence:   · Bowel: No  · Bladder: No  Urinary Catheter: None   Colostomy/Ileostomy/Ileal Conduit: No       Date of Last BM: 04/26/2021    Intake/Output Summary (Last 24 hours) at 4/26/2021 0651  Last data filed at 4/25/2021 1936  Gross per 24 hour   Intake 1540 ml   Output --   Net 1540 ml     I/O last 3 completed shifts: In: 7425 [P.O.:840; I.V.:700]  Out: -     Safety Concerns: At Risk for Falls    Impairments/Disabilities:      Speech and Contractures - BLE and BUE    Nutrition Therapy:  Current Nutrition Therapy:   - Oral Diet:  Dysphagia 1 pureed    Routes of Feeding: Oral  Liquids: Thin Liquids  Daily Fluid Restriction: no  Last Modified Barium Swallow with Video (Video Swallowing Test): not done    Treatments at the Time of Hospital Discharge:   Respiratory Treatments: ***  Oxygen Therapy:  is not on home oxygen therapy.   Ventilator:    - No ventilator support    Rehab Therapies: Physical Therapy and Occupational Therapy  Weight Bearing Status/Restrictions: No weight bearing restirctions  Other Medical Equipment (for information only, NOT a DME order):  hospital bed  Other Treatments: ***    Patient's personal belongings (please select all that are sent with patient):  None    RN SIGNATURE:  Electronically signed by Delilah Snellen on 4/27/21 at 10:47 AM EDT    CASE MANAGEMENT/SOCIAL WORK SECTION    Inpatient Status Date: ***    Readmission Risk Assessment Score:  Readmission Risk              Risk of Unplanned Readmission:        9           Discharging to Facility/ Agency   · Name: [facility]  · Address:  · Phone:  · Fax:    Dialysis Facility (if applicable)   · Name:  · Address:  · Dialysis Schedule:  · Phone:  · Fax:    / signature: Electronically signed by RUTH Lynn on 4/26/2021 at 1:47 PM      PHYSICIAN SECTION    Prognosis: {Prognosis:8146433338:::0}    Condition at Discharge: Pavan Alfaro Patient Condition:124133726:::0}    Rehab Potential (if transferring to Rehab): {Prognosis:6055973547:::0}    Recommended Labs or Other Treatments After Discharge: ***    Physician Certification: I certify the above information and transfer of Luis Christian  is necessary for the continuing treatment of the diagnosis listed and that she requires Intermediate Nursing Care for *** 30 days.      Update Admission H&P: {CHP DME Changes in HandP:[id number]:::0}    PHYSICIAN SIGNATURE:  Rohith Suarez MD.

## 2021-04-26 NOTE — PROGRESS NOTES
BETSY PROGRESS NOTE      Chief complaint: Follow-up of concern for UTI    The patient is a 80 y.o. female nursing home resident with history of cerebral palsy, epilepsy, presented on 04/24 with concern for stroke versus seizure after an episode of unresponsiveness lasting 45 minutes 1 day prior to admission. On admission, she was afebrile, hypotensive with no leukocytosis. Urinalysis showed packed WBCs with urine culture showing 75,000 CFU/mL of Proteus mirabilis. Procalcitonin was not elevated at 0.06 ng/mL. CT abdomen and pelvis showed calculus measuring 8 mm in left renal pelvis with surrounding peripelvic edema, additional left-sided renal calculi. Ceftriaxone was started. Subjective: Patient was seen and examined. She is not communicating. Objective:  BP (!) 113/53   Pulse 61   Temp 97.7 °F (36.5 °C) (Temporal)   Resp 16   Ht 5' (1.524 m) Comment: per home  Wt 104 lb (47.2 kg) Comment: per home 4/19/21  SpO2 92%   BMI 20.31 kg/m²   Constitutional: Awake, not in distress  Respiratory: Clear breath sounds, no crackles, no wheezes  Cardiovascular: Regular rate and rhythm, no murmurs  Gastrointestinal: Bowel sounds present, soft, nontender  Skin: Warm and dry, no active dermatoses  Musculoskeletal: No joint swelling, no joint erythema    Labs, imaging, and medical records/notes were personally reviewed. Assessment:  Hypotension, etiology unclear  Calculus of renal pelvis, left  Asymptomatic bacteriuria    Recommendations:  Monitor clinically off antibiotics for now. Follow up blood cultures. Consult Urology, per patient sister's request.  Continue supportive care.     Thank you for involving me in the care of NADEEN HOLGUIN. I will continue to follow. Please do not hesitate to call for any questions or concerns.     Electronically signed by Siomara Barton MD on 4/26/2021 at 11:01 AM

## 2021-04-27 VITALS
HEIGHT: 60 IN | TEMPERATURE: 97.9 F | RESPIRATION RATE: 16 BRPM | BODY MASS INDEX: 20.42 KG/M2 | HEART RATE: 57 BPM | WEIGHT: 104 LBS | DIASTOLIC BLOOD PRESSURE: 60 MMHG | SYSTOLIC BLOOD PRESSURE: 137 MMHG | OXYGEN SATURATION: 93 %

## 2021-04-27 LAB
ORGANISM: ABNORMAL
ORGANISM: ABNORMAL
URINE CULTURE, ROUTINE: ABNORMAL
URINE CULTURE, ROUTINE: ABNORMAL

## 2021-04-27 PROCEDURE — 6370000000 HC RX 637 (ALT 250 FOR IP): Performed by: NEUROMUSCULOSKELETAL MEDICINE & OMM

## 2021-04-27 RX ADMIN — CARBAMAZEPINE 600 MG: 200 SUSPENSION ORAL at 09:45

## 2021-04-27 RX ADMIN — PRIMIDONE 100 MG: 50 TABLET ORAL at 09:45

## 2021-04-27 ASSESSMENT — PAIN SCALES - WONG BAKER: WONGBAKER_NUMERICALRESPONSE: 0

## 2021-04-27 NOTE — ADT AUTH CERT
Tract Infection (UTI) - Care Day 2 (4/25/2021) by Dominik Das RN       Review Status Review Entered   Completed 4/25/2021 15:36      Criteria Review      Care Day: 2 Care Date: 4/25/2021 Level of Care: Telemetry    Guideline Day 2    Level Of Care    (X) Floor    4/25/2021 3:36 PM EDT by Suzan [address] [place] [address] Drive WITH TELEMETRY    Clinical Status    ( ) * Hypotension absent    4/25/2021 3:36 PM EDT by Tom Jauregui      b/p 90/16    (X) * Mental status at baseline    4/25/2021 3:36 PM EDT by Tom Jauregui      BASELINE PER CARDIOLOGY NOTE    (X) * Afebrile or fever improved    4/25/2021 3:36 PM EDT by Tom Jauregui      NO FEVER    (X) * Vomiting absent or improved    4/25/2021 3:36 PM EDT by Tom Jauregui      N/A    Activity    ( ) * Ambulatory    4/25/2021 3:36 PM EDT by Tom Jauregui      ORDER: Bathroom privileges with assist  IN BED PER RN NOTES    Routes    (X) IV fluids, medications    4/25/2021 3:36 PM EDT by Tom Jauregui      Normal Saline @ 75ml/h  Normal Saline 250 ml bolus x 1    (X) Advance diet as tolerated    4/25/2021 3:36 PM EDT by Tom Jauregui      General Dysphasia Pureed Diet    Interventions    ( ) * Reversible urinary system abnormality (eg, obstruction, abscess) absent, addressed, or to be treated at next level of care    4/25/2021 3:36 PM EDT by Tom Jauregui      CT Abdomen Pelvis  PENDING    Medications    (X) Antibiotics    4/25/2021 3:36 PM EDT by Tom Jauregui      Ceftriaxone 1000 mg IV every 24 hours - received today then discontinued    * Milestone   Additional Notes   4/25/2021  Care Day 2      VS:  T. 36.7, b/p 74/48, HR 58, R 17, SpO2 98% RA      Labs:   Chloride 108   Glucose 105   Calcium 7.6   Total Protein 5.2   Albumin 2.6         Urine Culture   Organism Proteus mirabilis    Urine Culture, Routine --   75,000 CFU/ml    Sensitivity to follow         Additional Orders   Consult Infectious Disease     New consult to Cardiology   Echo            Family Medicine H&P Note   CHIEF COMPLAINT:  Altered mental status, hypotension.       HISTORY OF CHIEF COMPLAINT:  This is a 68-year-old female with history   of cerebral palsy, previous CVA, epilepsy, presents to the emergency   room due to altered mental status.  The patient is from a nursing home   setting. Ralf Jennings is a DNR-CC status.  She reportedly had an unresponsive   episode that lasts about 4 to 5 minutes.  Symptoms came on suddenly. They were constant. Shaka Sutherland did resolve on their own without any   treatment.  It was noted at that time her blood pressure was low. Nursing home sent her in for evaluation of stroke versus seizure.  The   patient is nonverbal and unable to provide any further history. IMPRESSION:   1.  Acute cystitis/UTI. 2.  Altered mental status secondary to acute cystitis/UTI. 3.  Dehydration. 4.  Unresponsiveness with hypotension likely due to dehydration and   cystitis. 5.  Cerebral palsy. 6.  History of epilepsy. 7.  History of CVA. 8.  The patient's DNR-CC status.       PLAN:  The patient is admitted under Dr. Patience Gastelum service to general   medical floor. Moizzia Dejesus will start IV fluids and IV antibiotics of Rocephin 1   gm daily. Belle kathrine will obtain an ID consult for antibiotic management and   obtain Cardiology consult for her unresponsiveness and hypotension. Await further recommendations per consultants and treat accordingly. Disposition back to skilled nursing facility when medically stable. Cardiology Note   Reason for Consultation:                We were asked to see Axel Lakhani by Dr. Schuyler Rahman primary care provider on file.  regarding hypotension.       History of Chief Complaint:               This is a 80 y.o. female with a history of cerebral palsy, epilepsy, and depression. Evelyn Moctezuma is reclining nearly flat in bed.  She is sleeping comfortably.  She awakens easily.  She looks around and moves all 4 extremities but she does not communicate. Ralf Jennings is in no distress.  Per review of the chart the nurse in the emergency room spoke with the nurse at her nursing home.  Reportedly her baseline is that she is nonverbal and will pull away when touched.  To me today, as described above it appears that she is about at her baseline.  Reportedly yesterday at the nursing home she did not respond to sternal rub for about 45 minutes.  She has been diagnosed with a urinary tract infection. Assessment:    1. Reportedly unresponsive or less responsive at the nursing home yesterday. Leonard Gomez now appears to be at her baseline. 2. Low blood pressure. 3. She is not on telemetry. 4. Urinary tract infection   5. Cerebral palsy   6. Epilepsy   7. Depression           Recommendations:   1. DNR-CC. 2. Echo. 3. Conservative management. Infectious Disease Note      Assessment:   Hypotension, etiology unclear   Asymptomatic bacteriuria       Recommendations:   Stop ceftriaxone. Monitor clinically off antibiotics for now. Follow up blood cultures.    Continue supportive care.               Urinary Tract Infection (UTI) - Care Day 1 (4/24/2021) by Laney Palmer RN       Review Status Review Entered   Completed 4/25/2021 15:34      Criteria Review      Care Day: 1 Care Date: 4/24/2021 Level of Care: Telemetry    Guideline Day 1    Level Of Care    (X) Floor [G]    4/25/2021 3:34 PM EDT by Sintia Cody      MEDICAL SURGICAL UNIT WITH TELEMETRY    Clinical Status    (X) * Clinical Indications met [H]    Activity    (X) Activity as tolerated    4/25/2021 3:34 PM EDT by [id number] privileges with assist    Routes    (X) IV fluids, medications    4/25/2021 3:34 PM EDT by Sintia Cody      Normal Saline @ 75ml/h    (X) Diet as tolerated    4/25/2021 3:34 PM EDT by Sintia Cody      General Dysphasia Pureed Diet    Interventions    (X) Urinalysis and urine culture    4/25/2021 3:34 PM EDT by Sintia Cody      Urine Culture - results pending    (X) Possible blood cultures 4/25/2021 3:34 PM EDT by Janna Haile      Blood Cultures x 2 - results pending    (X) Possible CT, ultrasound, or other imaging test [C]    4/25/2021 3:34 PM EDT by Janna Haile      CT Abdomen Pelvis    Medications    (X) Antibiotics    4/25/2021 3:34 PM EDT by Janna Haile      Ceftriaxone 1000 mg IV every 24 hours    * Milestone   Additional Notes   4/24/2021  Care Day 1      VS:  T. 37.1, b/p 105/88, HR 77, R 18, SpO2 98% RA      Weight 104 pounds   BMI 20.4            Additional Orders:   Consult Infectious Disease            Infectious Disease Note   Reason for Consult: Concern for UTI   Chief complaint: Unresponsiveness          HISTORY OFPRESENT ILLNESS                 The patient is a 80 y.o. female nursing home resident with history of cerebral palsy, epilepsy, presented on 04/24 with concern for stroke versus seizure after an episode of unresponsiveness lasting 45 minutes 1 day prior to admission.  On admission, she was afebrile, hypotensive with no leukocytosis.  Urinalysis showed packed WBCs.  Ceftriaxone was started.  ID service was subsequently consulted for further recommendations         Assessment:   Hypotension, etiology unclear   Bacteriuria       Plan:   Continue ceftriaxone 1g q24h for now. Check blood cultures. Check CT abdomen and pelvis. Follow up urine culture. Check procalcitonin.    Continue supportive care.                     Urinary Tract Infection (UTI) - Clinical Indications for Admission to Inpatient Care by Robbin Lamb RN       Review Status Review Entered   Completed 4/25/2021 15:14      Criteria Review      Clinical Indications for Admission to Inpatient Care    Most Recent : Janna Haile Most Recent Date: 4/25/2021 3:14 PM EDT    (X) Admission is indicated for  1 or more  of the following  (1) (2) (3) (4) (5) (6) (7) (8):       (X) Hemodynamic instability       4/25/2021 3:14 PM EDT by Iva RODRIGUEZ/ 119/71  72/40  84/57  97/69  105/88  116/58  74/48  HYPOTENSION CONTINUES INTO 4/25/2021   Additional Notes   4/24/2021  Presented to ED      HPI   Patient is a 79-year-old female with history of cerebral palsy, previous CVA, epilepsy that presents emergency department for evaluation of altered mental status.  Patient from nursing home. Félix Lewis reportedly had an unresponsive episode yesterday that lasted 4 to 5 minutes.  Symptoms came on suddenly. Rhonda Debar were constant and severe. Rhonda Debar did resolve on their own without any treatment.  They noted at that time that her blood pressure was low.  Physician from nursing home decided sent patient in for evaluation of stroke vs seizure.  Patient is noted to be a DNR CC. Félix Lewis is nonverbal and unable provide further history.       Review of Systems    Unable to perform ROS: Patient nonverbal       Past Medical History:  has a past medical history of Arthritis, Cerebral palsy ([address] Depression, and Epilepsy ([facility] [address]     Past Surgical History:  has no past surgical history on file. Physical Exam   Constitutional:        General: She is not in acute distress.      Appearance: She is not ill-appearing. HENT:       Head: Normocephalic and atraumatic.       Mouth/Throat:       Mouth: Mucous membranes are moist.    Eyes:       Pupils: Pupils are equal, round, and reactive to light. Neck:       Musculoskeletal: No muscular tenderness. Cardiovascular:       Rate and Rhythm: Normal rate and regular rhythm.       Pulses: Normal pulses. Pulmonary:       Effort: Pulmonary effort is normal.       Breath sounds: Normal breath sounds. No wheezing or rhonchi. Abdominal:       Palpations: Abdomen is soft.       Tenderness: There is no abdominal tenderness. There is no guarding or rebound. Musculoskeletal:       Right lower leg: No edema.       Left lower leg: No edema. Skin:      General: Skin is warm and dry. Neurological:       Mental Status: She is alert.  Mental status is at baseline.       GCS: GCS eye subscore is 4. GCS verbal subscore is 1. GCS motor subscore is 5.       Comments: Patient arms and legs contracted which is baseline.               VS:  T. 36.3, b/p 119/72 (repeat 72/40, 84/57), HR 63-67, R 18, SpO2 94% RA      Labs:   Potassium 5.5   Calcium 8.6   Albumin 3.2   Alk Phos 108      UA   Clarity Turbid   Blood Small   Protein 30   Leukocyte Esterase Large   WBC Packed   RBC 10-20   Bacterial Many   Epithelial Cells Few            Treatment in ED   Normal Saline 500 ml bolus x 1   Normal Saline 1000 ml bolus x 1   Ceftriaxone 1000 mg IV x 1            Diagnosis:   1. Altered mental status, unspecified altered mental status type    2. Acute cystitis without hematuria                Given that patient now hypotensive and has a source of infection, plan to give patient a dose of Rocephin and admit for IV hydration and further antibiotics.

## 2021-04-27 NOTE — PROGRESS NOTES
BETSY PROGRESS NOTE      Chief complaint: Follow-up of concern for UTI    The patient is a 80 y.o. female nursing home resident with history of cerebral palsy, epilepsy, presented on 04/24 with concern for stroke versus seizure after an episode of unresponsiveness lasting 45 minutes 1 day prior to admission. On admission, she was afebrile, hypotensive with no leukocytosis. Urinalysis showed packed WBCs with urine culture showing 75,000 CFU/mL of Proteus mirabilis (susceptible to ampicillin) and E coli (non-ESBL; susceptible to ampicillin). Procalcitonin was not elevated at 0.06 ng/mL. CT abdomen and pelvis showed calculus measuring 8 mm in left renal pelvis with surrounding peripelvic edema, additional left-sided renal calculi. Ceftriaxone was started. Subjective: Patient was seen and examined. She is not communicating. According to her sister, she is eating well. Objective:  /60   Pulse 57   Temp 97.9 °F (36.6 °C) (Temporal)   Resp 16   Ht 5' (1.524 m) Comment: per home  Wt 104 lb (47.2 kg) Comment: per home 4/19/21  SpO2 93%   BMI 20.31 kg/m²   Constitutional: Awake, not in distress  Respiratory: Clear breath sounds, no crackles, no wheezes  Cardiovascular: Regular rate and rhythm, no murmurs  Gastrointestinal: Bowel sounds present, soft, nontender  Skin: Warm and dry, no active dermatoses  Musculoskeletal: No joint swelling, no joint erythema    Labs, imaging, and medical records/notes were personally reviewed. Assessment:  Hypotension, etiology unclear  Calculus of renal pelvis, left  Asymptomatic bacteriuria. Received ceftriaxone. Recommendations:  Monitor clinically off antibiotics for now. Follow up blood cultures. Continue supportive care. For ESWL as outpatient per Urology.     Thank you for involving me in the care of NADEEN HOLGUIN. I will continue to follow. Please do not hesitate to call for any questions or concerns.     Electronically signed by Félix Shin MD on 4/27/2021 at 11:03 AM

## 2021-04-27 NOTE — PROGRESS NOTES
Admit Date: 4/24/2021    Subjective:     No new event awake non verbal , her baseline   Urology consult appreciated     Objective:     Patient Vitals for the past 8 hrs:   BP Temp Temp src Pulse Resp   04/27/21 0000 97/68 98.1 °F (36.7 °C) Temporal 58 16     I/O last 3 completed shifts: In: 120 [P.O.:120]  Out: -   No intake/output data recorded. HEENT: Normal  NECK: Thyroid normal. No carotid bruit. No lymphphadenopathy. CVS: RRR  RS: Clear. No wheeze. No rhonchi. Good airflow bilaterally. ABD: Soft. Non tender. No mass. Normal BS. EXT: No edema. Non tender. Contraction   NEURO:non verbal       Scheduled Meds:   sodium chloride  250 mL Intravenous Once    carBAMazepine  600 mg Oral TID    mirtazapine  15 mg Oral Nightly    primidone  100 mg Oral TID     Continuous Infusions:   sodium chloride 75 mL/hr at 04/24/21 1421       CBC with Differential:    Lab Results   Component Value Date    WBC 5.1 04/24/2021    RBC 3.58 04/24/2021    HGB 11.7 04/24/2021    HCT 37.5 04/24/2021     04/24/2021    .7 04/24/2021    MCH 32.7 04/24/2021    MCHC 31.2 04/24/2021    RDW 14.5 04/24/2021    LYMPHOPCT 35.5 04/24/2021    MONOPCT 9.4 04/24/2021    BASOPCT 0.4 04/24/2021    MONOSABS 0.48 04/24/2021    LYMPHSABS 1.82 04/24/2021    EOSABS 0.17 04/24/2021    BASOSABS 0.02 04/24/2021     CMP:    Lab Results   Component Value Date     04/25/2021    K 4.2 04/25/2021     04/25/2021    CO2 26 04/25/2021    BUN 13 04/25/2021    CREATININE 0.6 04/25/2021    GFRAA >60 04/25/2021    LABGLOM >60 04/25/2021    PROT 5.2 04/25/2021    LABALBU 2.6 04/25/2021    CALCIUM 7.6 04/25/2021    BILITOT <0.2 04/25/2021    ALKPHOS 90 04/25/2021    AST 12 04/25/2021    ALT 7 04/25/2021     PT/INR:  No results found for: PROTIME, INR    Assessment:     Active Problems:   AMS    bacteriuria    renal calculus   Cerebral palsy       Plan:   stable will transfer back to NH.

## 2021-04-27 NOTE — PLAN OF CARE
Problem: Falls - Risk of:  Goal: Will remain free from falls  Description: Will remain free from falls  4/27/2021 1030 by Levi Duran RN  Outcome: Ongoing  4/26/2021 2031 by Tania Lyons RN  Outcome: Met This Shift  Goal: Absence of physical injury  Description: Absence of physical injury  4/27/2021 1030 by Levi Duran RN  Outcome: Ongoing  4/26/2021 2031 by Tania Lyons RN  Outcome: Met This Shift     Problem: Skin Integrity:  Goal: Will show no infection signs and symptoms  Description: Will show no infection signs and symptoms  Outcome: Ongoing  Goal: Absence of new skin breakdown  Description: Absence of new skin breakdown  4/27/2021 1030 by Levi Duran RN  Outcome: Ongoing  4/26/2021 2031 by Tania Lyons RN  Outcome: Met This Shift

## 2021-04-27 NOTE — CARE COORDINATION
4/27/2021 social work transition of care planning  Sw set up 2 pm transport back to [facility]. Sw notified Nurse,faclity liaison, and pt's legal guardian-Kvng.   Electronically signed by RUTH Giron on 4/27/2021 at 9:29 AM

## 2021-04-28 NOTE — DISCHARGE SUMMARY
Discharge Summary    Date: 4/28/2021  Patient Name: Kelly North YOB: 1940 Age: 80 y.o. Admit Date: 4/24/2021  Discharge Date: 4/27/2021  Discharge Condition: [address]    Admission Diagnosis  Acute cystitis without hematuria (N30.00); Cystitis (N30.90)     Discharge Diagnosis  Active Problems: Acute cystitis without hematuria CystitisResolved Problems: * No resolved hospital problems. [facility] Stay  Narrative of Hospital Course:  [facility] patient admitted from ER with AMS found with bacteriuria and non obstructing renal calculus received Rocephin seen by ID and urology did fine stabilized back to her abseline discharged back to [facility] to arrange for ESWL as outpatient     Consultants:  [address] TO CARDIOLOGYIP CONSULT TO UROLOGY    Surgeries/procedures Performed:       Treatments:           Discharge Plan/Disposition:  To [facility]    Hospital/Incidental Findings Requiring Follow Up:    Patient Instructions:    Diet: Regular Diet    Activity:Activity as Tolerated  For number of days (if applicable): Other Instructions:    Provider Follow-Up:   No follow-ups on file. Significant Diagnostic Studies:    Recent Labs:  Admission on 04/24/2021, Discharged on 04/27/2021Sodium                                       Date: 04/24/2021Value: 136         Ref range: 132 - 146 mmol/L   Status: FinalPotassium                                     Date: 04/24/2021Value: 5.5*        Ref range: 3.5 - 5.0 mmol/L   Status: Final              Comment: Specimen is moderately Hemolyzed. Result may be artificially increased. Chloride                                      Date: 04/24/2021Value: 103         Ref range: 98 - 107 mmol/L    Status: FinalCO2                                           Date: 04/24/2021Value: 24          Ref range: 22 - 29 mmol/L     Status: FinalAnion Gap                                     Date: 04/24/2021Value: 9           Ref range: 7 - 16 mmol/L      Status: FinalGlucose                                       Date: 04/24/2021Value: 84          Ref range: 74 - 99 mg/dL      Status: FinalBUN                                           Date: 04/24/2021Value: 20          Ref range: 6 - 23 mg/dL       Status: FinalCREATININE                                    Date: 04/24/2021Value: 0.7         Ref range: 0.5 - 1.0 mg/dL    Status: FinalGFR Non-                      Date: 04/24/2021Value: >60         Ref range: >=60 mL/min/1.73   Status: Final              Comment: Chronic Kidney Disease: less than 60 ml/min/1.73 sq.m. Kidney Failure: less than 15 ml/min/1.73 sq. m. Results valid for patients 18 years and older. GFR                           Date: 04/24/2021Value: >60           Status: FinalCalcium                                       Date: 04/24/2021Value: 8.6         Ref range: 8.6 - 10.2 mg/dL   Status: FinalTotal Protein                                 Date: 04/24/2021Value: 6.7         Ref range: 6.4 - 8.3 g/dL     Status: FinalAlbumin                                       Date: 04/24/2021Value: 3.2*        Ref range: 3.5 - 5.2 g/dL     Status: FinalTotal Bilirubin                               Date: 04/24/2021Value: <0.2        Ref range: 0.0 - 1.2 mg/dL    Status: FinalAlkaline Phosphatase                          Date: 04/24/2021Value: 108*        Ref range: 35 - 104 U/L       Status: FinalALT                                           Date: 04/24/2021Value: 13          Ref range: 0 - 32 U/L         Status: FinalAST                                           Date: 04/24/2021Value: 28          Ref range: 0 - 31 U/L         Status: Final              Comment: Specimen is moderately Hemolyzed. Result may be artificially increased. WBC                                           Date: 04/24/2021Value: 5.1         Ref range: 4.5 - 11.5 E9/L    Status: [facility] Date: 04/24/2021Value: 3.58        Ref range: 3.50 - 5.50 E12/L  Status: FinalHemoglobin                                    Date: 04/24/2021Value: 11.7        Ref range: 11.5 - 15.5 g/dL   Status: FinalHematocrit                                    Date: 04/24/2021Value: 37.5        Ref range: 34.0 - 48.0 %      Status: FinalMCV                                           Date: 04/24/2021Value: 104.7*      Ref range: 80.0 - 99.9 fL     Status: [facility]                                           Date: 04/24/2021Value: 32.7        Ref range: 26.0 - 35.0 pg     Status: [address]                                          Date: 04/24/2021Value: 31.2*       Ref range: 32.0 - 34.5 %      Status: FinalRDW                                           Date: 04/24/2021Value: 14.5        Ref range: 11.5 - 15.0 fL     Status: FinalPlatelets                                     Date: 04/24/2021Value: 200         Ref range: 130 - 450 E9/L     Status: FinalMPV                                           Date: 04/24/2021Value: 12.2*       Ref range: 7.0 - 12.0 fL      Status: FinalNeutrophils %                                 Date: 04/24/2021Value: 51.2        Ref range: 43.0 - 80.0 %      Status: FinalImmature Granulocytes %                       Date: 04/24/2021Value: 0.2         Ref range: 0.0 - 5.0 %        Status: FinalLymphocytes %                                 Date: 04/24/2021Value: 35.5        Ref range: 20.0 - 42.0 %      Status: FinalMonocytes %                                   Date: 04/24/2021Value: 9.4         Ref range: 2.0 - 12.0 %       Status: FinalEosinophils %                                 Date: 04/24/2021Value: 3.3         Ref range: 0.0 - 6.0 %        Status: FinalBasophils %                                   Date: 04/24/2021Value: 0.4         Ref range: 0.0 - 2.0 %        Status: FinalNeutrophils Absolute                          Date: 04/24/2021Value: 2.62        Ref range: 1.80 - 7.30 E9/L Status: FinalImmature Granulocytes #                       Date: 04/24/2021Value: 0.01        Ref range: E9/L               Status: FinalLymphocytes Absolute                          Date: 04/24/2021Value: 1.82        Ref range: 1.50 - 4.00 E9/L   Status: FinalMonocytes Absolute                            Date: 04/24/2021Value: 0.48        Ref range: 0.10 - 0.95 E9/L   Status: FinalEosinophils Absolute                          Date: 04/24/2021Value: 0.17        Ref range: 0.05 - 0.50 E9/L   Status: FinalBasophils Absolute                            Date: 04/24/2021Value: 0.02        Ref range: 0.00 - 0.20 E9/L   Status: FinalColor, UA                                     Date: 04/24/2021Value: Yellow      Ref range: Straw/Yellow       Status: FinalClarity, UA                                   Date: 04/24/2021Value: TURBID*     Ref range: Clear              Status: Final              Comment: Urinalysis chem strip read manually. Glucose, Ur                                   Date: 04/24/2021Value: Negative    Ref range: Negative mg/dL     Status: FinalBilirubin Urine                               Date: 04/24/2021Value: Negative    Ref range: Negative           Status: FinalKetones, Urine                                Date: 04/24/2021Value: Negative    Ref range: Negative mg/dL     Status: FinalSpecific Gravity, UA                          Date: 04/24/2021Value: 1.010       Ref range: 1.005 - 1.030      Status: FinalBlood, Urine                                  Date: 04/24/2021Value: SMALL*      Ref range: Negative           Status: FinalpH, UA                                        Date: 04/24/2021Value: 6.0         Ref range: 5.0 - 9.0          Status: FinalProtein, UA                                   Date: 04/24/2021Value: 30*         Ref range: Negative mg/dL     Status: FinalUrobilinogen, Urine                           Date: 04/24/2021Value: 0.2         Ref range: <2.0 E.U./dL       Status: FinalNitrite, Urine Date: 04/24/2021Value: Negative    Ref range: Negative           Status: FinalLeukocyte Esterase, Urine                     Date: 04/24/2021Value: LARGE*      Ref range: Negative           Status: FinalVentricular Rate                              Date: 04/24/2021Value: 73          Ref range: BPM                Status: FinalAtrial Rate                                   Date: 04/24/2021Value: 73          Ref range: BPM                Status: FinalQRS Duration                                  Date: 04/24/2021Value: 86          Ref range: ms                 Status: FinalQ-T Interval                                  Date: 04/24/2021Value: 402         Ref range: ms                 Status: FinalQTc Calculation (Bazett)                      Date: 04/24/2021Value: 442         Ref range: ms                 Status: FinalR Axis                                        Date: 04/24/2021Value: 19          Ref range: degrees            Status: FinalT Axis                                        Date: 04/24/2021Value: 87          Ref range: degrees            Status: FinalTroponin                                      Date: 04/24/2021Value: <0.01       Ref range: 0.00 - 0.03 ng/mL  Status: Final              Comment: SPECIMEN IS MODERATELY HEMOLYZED. Result may be artificially decreased. TROPONIN T BLOOD LEVELS:       0.03 ng/mL     Upper Reference Limit0. 04 - 0.09 ng/mL     Possible myocardial injury    >= 0.10 ng/mL     Myocardial injuryWBC, UA                                       Date: 04/24/2021Value: PACKED*     Ref range: 0 - 5 /HPF         Status: FinalRBC, UA                                       Date: 04/24/2021Value: 10-20*      Ref range: 0 - 2 /HPF         Status: FinalEpithelial Cells, UA                          Date: 04/24/2021Value: FEW         Ref range: /HPF               Status: FinalBacteria, UA                                  Date: 04/24/2021Value: MANY*       Ref range: None Seen /HPF Status: FinalOrganism                                      Date: 04/24/2021Value: Proteus mirabilis                     Status: FinalUrine Culture, Routine                        Date: 04/24/2021Value: 75,000 CFU/ml                     Status: FinalOrganism                                      Date: 04/24/2021Value: Escherichia coli                     Status: FinalUrine Culture, Routine                        Date: 04/24/2021Value: 75,000 CFU/ml                     Status: FinalBlood Culture, Routine                        Date: 04/24/2021Value: 24 Hours no growth                     Status: PreliminaryCulture, Blood 2                              Date: 04/24/2021Value: 24 Hours no growth                     Status: PreliminaryProcalcitonin                                 Date: 04/24/2021Value: 0.06        Ref range: 0.00 - 0.08 ng/mL  Status: Final              Comment: Suspected Sepsis:Low likelihood of sepsis  <.50 ng/mLIncreased likelihood of sepsis 0.50-2.00 ng/mLAntibiotics encouragedHigh risk of sepsis/shock   >2.00 ng/mLAntibiotics strongly encouragedSuspected Lower Respiratory Tract Infections:Low likelihood of bacterial infection  <0.24 ng/mLIncreased likelihood of bacterial infection >0.24 ng/mLAntibiotics encouragedWith successful antibiotic therapy, PCT levels should decreaserapidly. (Half-life of 24 to 36 hours. )Procalcitonin values from samples collected within the first6 hours of systemic infection may still be low. Retesting may be indicated. Values from day 1 and day 4 can be entered into the Change inProcalcitonin Calculator to determine the patient'sMortality Risk http://www.hernandez.info/. com)In healthy neonates, plasma Procalcitonin (PCT) concentrationsincrease gradually after birth, reaching peak values at about24 hours of age then decrease to normal values below 0.5                        ng/mLby 48-72 hours of age. Sodium                                        Date: 04/25/2021Value: 143         Ref range: 132 - 146 mmol/L   Status: FinalPotassium                                     Date: 04/25/2021Value: 4.2         Ref range: 3.5 - 5.0 mmol/L   Status: FinalChloride                                      Date: 04/25/2021Value: 108*        Ref range: 98 - 107 mmol/L    Status: FinalCO2                                           Date: 04/25/2021Value: 26          Ref range: 22 - 29 mmol/L     Status: FinalAnion Gap                                     Date: 04/25/2021Value: 9           Ref range: 7 - 16 mmol/L      Status: FinalGlucose                                       Date: 04/25/2021Value: 105*        Ref range: 74 - 99 mg/dL      Status: FinalBUN                                           Date: 04/25/2021Value: 13          Ref range: 6 - 23 mg/dL       Status: FinalCREATININE                                    Date: 04/25/2021Value: 0.6         Ref range: 0.5 - 1.0 mg/dL    Status: FinalGFR Non-                      Date: 04/25/2021Value: >60         Ref range: >=60 mL/min/1.73   Status: Final              Comment: Chronic Kidney Disease: less than 60 ml/min/1.73 sq.m. Kidney Failure: less than 15 ml/min/1.73 sq. m. Results valid for patients 18 years and older. GFR                           Date: 04/25/2021Value: >60           Status: FinalCalcium                                       Date: 04/25/2021Value: 7.6*        Ref range: 8.6 - 10.2 mg/dL   Status: FinalTotal Protein                                 Date: 04/25/2021Value: 5.2*        Ref range: 6.4 - 8.3 g/dL     Status: FinalAlbumin                                       Date: 04/25/2021Value: 2.6*        Ref range: 3.5 - 5.2 g/dL     Status: FinalTotal Bilirubin                               Date: 04/25/2021Value: <0.2        Ref range: 0.0 - 1.2 mg/dL    Status: FinalAlkaline Phosphatase                          Date: 04/25/2021Value: 90          Ref range: 35 - 104 U/L MedPRIMIDONE POTake 100 mg by mouth 3 times daily Patient takes 2mL of a 250 mg/5mL suspension three times daily with mealsHistorical Med    Discharge Medication List as of 4/27/2021  1:35 PM    Time Spent on Discharge:3E] minutes were spent in patient examination, evaluation, counseling as well as medication reconciliation, prescriptions for required medications, discharge plan, and follow up.     Electronically signed by Bren Penny MD on 4/28/21 at 7:29 AM EDT

## 2021-04-29 LAB
BLOOD CULTURE, ROUTINE: NORMAL
CULTURE, BLOOD 2: NORMAL

## 2021-04-30 ENCOUNTER — TELEPHONE (OUTPATIENT)
Dept: CARDIOLOGY CLINIC | Age: 81
End: 2021-04-30

## 2021-10-21 ENCOUNTER — HOSPITAL ENCOUNTER (EMERGENCY)
Age: 81
Discharge: NURSING, REHAB OR LONG-TERM CARE | End: 2021-10-21
Attending: EMERGENCY MEDICINE
Payer: MEDICARE

## 2021-10-21 ENCOUNTER — APPOINTMENT (OUTPATIENT)
Dept: GENERAL RADIOLOGY | Age: 81
End: 2021-10-21
Payer: MEDICARE

## 2021-10-21 VITALS
OXYGEN SATURATION: 99 % | HEART RATE: 71 BPM | TEMPERATURE: 98.2 F | RESPIRATION RATE: 16 BRPM | DIASTOLIC BLOOD PRESSURE: 63 MMHG | WEIGHT: 100 LBS | BODY MASS INDEX: 18.4 KG/M2 | HEIGHT: 62 IN | SYSTOLIC BLOOD PRESSURE: 174 MMHG

## 2021-10-21 DIAGNOSIS — S72.8X2A OTHER CLOSED FRACTURE OF LEFT FEMUR, UNSPECIFIED PORTION OF FEMUR, INITIAL ENCOUNTER (HCC): Primary | ICD-10-CM

## 2021-10-21 DIAGNOSIS — S82.402A CLOSED FRACTURE OF LEFT TIBIA AND FIBULA, INITIAL ENCOUNTER: ICD-10-CM

## 2021-10-21 DIAGNOSIS — S82.202A CLOSED FRACTURE OF LEFT TIBIA AND FIBULA, INITIAL ENCOUNTER: ICD-10-CM

## 2021-10-21 PROCEDURE — 99284 EMERGENCY DEPT VISIT MOD MDM: CPT

## 2021-10-21 PROCEDURE — 73600 X-RAY EXAM OF ANKLE: CPT

## 2021-10-21 PROCEDURE — 73590 X-RAY EXAM OF LOWER LEG: CPT

## 2021-10-21 PROCEDURE — 73552 X-RAY EXAM OF FEMUR 2/>: CPT

## 2021-10-21 PROCEDURE — 73502 X-RAY EXAM HIP UNI 2-3 VIEWS: CPT

## 2021-10-21 PROCEDURE — 73560 X-RAY EXAM OF KNEE 1 OR 2: CPT

## 2021-10-21 PROCEDURE — 96374 THER/PROPH/DIAG INJ IV PUSH: CPT

## 2021-10-21 PROCEDURE — 2500000003 HC RX 250 WO HCPCS: Performed by: EMERGENCY MEDICINE

## 2021-10-21 PROCEDURE — 73620 X-RAY EXAM OF FOOT: CPT

## 2021-10-21 PROCEDURE — 29505 APPLICATION LONG LEG SPLINT: CPT

## 2021-10-21 RX ORDER — KETAMINE HYDROCHLORIDE 10 MG/ML
20 INJECTION, SOLUTION INTRAMUSCULAR; INTRAVENOUS ONCE
Status: COMPLETED | OUTPATIENT
Start: 2021-10-21 | End: 2021-10-21

## 2021-10-21 RX ADMIN — KETAMINE HYDROCHLORIDE 20 MG: 10 INJECTION, SOLUTION INTRAMUSCULAR; INTRAVENOUS at 17:15

## 2021-10-21 ASSESSMENT — PAIN SCALES - GENERAL: PAINLEVEL_OUTOF10: 0

## 2021-10-21 NOTE — ED NOTES
Pt arrived to the ED from nursing home with L tib fib, and femur fracture. EMS states that nursing home reports multiple falls recently. Family requesting patient not be admitted to hospital due to her [facility] status. Swelling noted to LLE but pulse is present 1+. Pt uncooperative with exam but does grimace when leg is adjusted or examined.  A&Ox1 per EMS     [facility], RN  10/21/21 [id number]

## 2021-10-21 NOTE — ED PROVIDER NOTES
HPI:  10/21/21, Time: 7:45 PM EDT         Sierra Villela is a 80 y.o. female presenting to the ED for leg fracture. Patient is nonverbal patient with cerebral palsy. She presents from a nursing facility. It is reported that she was found to have a left tib-fib fracture along with a femur fracture. She was sent in for evaluation. She is a DNR CC. No other information is available at this time. Review of Systems:   Unable to obtain due to the patient's current mental status          --------------------------------------------- PAST HISTORY ---------------------------------------------  Past Medical History:  has no past medical history on file. Past Surgical History:  has no past surgical history on file. Social History:      Family History: family history is not on file. The patients home medications have been reviewed. Allergies: Patient has no known allergies. -------------------------------------------------- RESULTS -------------------------------------------------  All laboratory and radiology results have been personally reviewed by myself   LABS:  No results found for this visit on 10/21/21. RADIOLOGY:  Interpreted by Radiologist.  XR KNEE LEFT (1-2 VIEWS)   Final Result   Impacted and displaced distal femoral fracture with adjacent soft tissue   swelling, improved alignment post closed reduction. XR TIBIA FIBULA LEFT (2 VIEWS)   Final Result   Displaced distal femur fracture extending to the femoral notch. Displaced fractures distal shafts of the tibia and fibula. Probable remote proximal tibial fracture. XR ANKLE LEFT (2 VIEWS)   Final Result   1. Status post placement of casting/splint material which limits underlying   osseous detail   2. Similar position of fractures involving distal tibia and fibula. XR ANKLE RIGHT (2 VIEWS)   Final Result   No definite acute bony pathology.          XR FOOT RIGHT (2 VIEWS)   Final Result   No definite acute intervention. After discussion with Ortho, family is agreeable for splinting and discharge. Patient was discharged. Counseling: The emergency provider has spoken with the family member  and discussed todays results, in addition to providing specific details for the plan of care and counseling regarding the diagnosis and prognosis. Questions are answered at this time and they are agreeable with the plan.      --------------------------------- IMPRESSION AND DISPOSITION ---------------------------------    IMPRESSION  1. Other closed fracture of left femur, unspecified portion of femur, initial encounter ([facility] [address]    2. Closed fracture of left tibia and fibula, initial encounter        DISPOSITION  Disposition: Discharge to home  Patient condition is stable      NOTE: This report was transcribed using voice recognition software.  Every effort was made to ensure accuracy; however, inadvertent computerized transcription errors may be present       Anatoly Menendez MD  10/21/21 2019

## 2021-10-21 NOTE — CONSULTS
the left lateral decubitus position, nonverbal, no acute distress  MUSCULOSKELETAL:  Left lower Extremity:  · Skin circumferentially intact no evidence of superficial lacerations or abrasions. There is ecchymosis posteriorly over the popliteal fossa. Soft tissue edema noted at the distal femur, and distal shin/ankle. No visible erythema or drainage. · TTP not able to be assessed due to patient status. However when I do touch her distal femur and distal tibia/ankle she motions her hand and tries to remove my hand. · Sensation not able to be  assess due to patient status. · Motor motor function not able to be assessed due to patient status. However she is spontaneously moving her toes while I examine her. · Compartment soft and compressible  · +2/4 DP PT pulses, foot warm and well cap refill less than 2 seconds    Secondary Exam:   · bilateralUE: No obvious signs of trauma. Right wrist is postured in extension. Chronic per guardian. · rightLE: Dorsal ecchymosis over the toes. · Pelvis: No obvious signs of trauma. DATA:    CBC: No results found for: WBC, RBC, HGB, HCT, MCV, MCH, MCHC, RDW, PLT, MPV  PT/INR:  No results found for: PROTIME, INR    Radiology Review:  XR 4 views of the left femur reviewed demonstrating acute distal femur fracture. Fracture is displaced and in flexion. There is also a questionable tibial plateau fracture of undetermined age demonstrated on these views. Patient has diffuse osteopenia no other acute fracture dislocations. No other bony or soft tissue abnormalities. 3 views of the left knee reviewed demonstrating the above fractures and findings. Nothing new to report with these films. 4 views of the left tibia and fibula are reviewed demonstrating fracture as above as well as a comminuted distal one third tibial fracture, pilon variant. There is questionable medial malleoli involvement. These images are overexposed.   The ankle joint as well as the knee seem to be reduced in these films. No other fracture dislocations appreciated on these films. No other bony or soft tissue abnormalities noted. 4 views of the right foot and ankle reviewed showing no obvious fractures or dislocations. Osteopenia present. 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 No other soft tissue or bony abnormalities present. Post reduction views of the left ankle, tib/fib, and knee show minimal increase in alignment. They are other wise unchanged from the previous studies. IMPRESSION:  · Closed, left distal femur fracture, appears to be acute  · Closed, left tibial plateau fracture, of undetermined age  · Closed, left distal one third tibial shaft fracture, pilon variant, appears to be acute    PROCEDURE:  Patient was given a pain dose of ketamine by the ED staff, a posterior long leg splint with stirrups was applied after copiously padding the leg. She was not able to be adequately sedated for proper reduction of her injuries. She is chronically spastic and could not be extended at the knee or dorsiflexed at the ankle. The best attempt was made to reduce her fractures under the circumstances that were given. This would other wise be an operative extremity, but due to the nature of her status and guardian wishes this is purely a supportive measure. PLAN:  · Nonweightbearing left lower extremity, long-leg splint placed. · Patient is guarding is refusing admission at this time due to patient is DNR CC status  · Pain control per ED  · DVT Prophylaxis per ED  · No acute orthopedic intervention planned due to patient's [facility] status and guardian's wishes  · Follow up with Dr. Cele Mora as an outpatient  · All patient/family questions have been answered and patient is currently agreeable to plan.   · Discuss with Attending Dr. Cele Mora      Electronically signed by Shawn Whitaker DO on 10/21/2021 at 4:41 PM

## 2021-10-21 NOTE — ED NOTES
Bed: HBB  Expected date:   Expected time:   Means of arrival:   Comments:  EMS     Fara Nicolas RN  10/21/21 0806

## 2021-10-21 NOTE — CARE COORDINATION
Social Work /Transition of Care:    Pt presented to the ED secondary to a leg injury at [facility]. Per documentation from nursing facility, pt has a legal guardian, Matias Blancasmarielpadmini ([phone]). SW spoke to Joana Skelton over the phone who reports he has legal guardianship and pt is a [facility]. Pt does not have legal guardian paperwork scanned in to chart. Joana Skelton stated he will bring paperwork to ED. Per ED physician, orthopedic physicians will speak with legal guardian. Per Katalina Rinaldi with [facility], pt is a long term care bed hold and able to return when medically clear.

## 2022-09-21 PROCEDURE — 99307 SBSQ NF CARE SF MDM 10: CPT | Performed by: INTERNAL MEDICINE

## 2022-09-22 ENCOUNTER — OUTSIDE SERVICES (OUTPATIENT)
Dept: PRIMARY CARE CLINIC | Age: 82
End: 2022-09-22
Payer: MEDICARE

## 2022-09-22 DIAGNOSIS — G40.919 EPILEPSY WITH ALTERED CONSCIOUSNESS WITH INTRACTABLE EPILEPSY (HCC): ICD-10-CM

## 2022-09-22 DIAGNOSIS — G80.0 SPASTIC QUADRIPLEGIC CEREBRAL PALSY (HCC): Primary | ICD-10-CM

## 2022-09-22 DIAGNOSIS — M62.81 MUSCLE WEAKNESS (GENERALIZED): ICD-10-CM

## 2022-09-22 DIAGNOSIS — R29.3 ABNORMAL POSTURE: ICD-10-CM

## 2022-10-19 ENCOUNTER — OUTSIDE SERVICES (OUTPATIENT)
Dept: PRIMARY CARE CLINIC | Age: 82
End: 2022-10-19
Payer: MEDICARE

## 2022-10-19 DIAGNOSIS — G30.1 MODERATE LATE ONSET ALZHEIMER'S DEMENTIA WITHOUT BEHAVIORAL DISTURBANCE, PSYCHOTIC DISTURBANCE, MOOD DISTURBANCE, OR ANXIETY (HCC): ICD-10-CM

## 2022-10-19 DIAGNOSIS — M24.50 JOINT CONTRACTION: ICD-10-CM

## 2022-10-19 DIAGNOSIS — F02.B0 MODERATE LATE ONSET ALZHEIMER'S DEMENTIA WITHOUT BEHAVIORAL DISTURBANCE, PSYCHOTIC DISTURBANCE, MOOD DISTURBANCE, OR ANXIETY (HCC): ICD-10-CM

## 2022-10-19 PROBLEM — F02.80 LATE ONSET ALZHEIMER DEMENTIA (HCC): Status: ACTIVE | Noted: 2022-10-19

## 2022-10-19 PROCEDURE — 99307 SBSQ NF CARE SF MDM 10: CPT | Performed by: INTERNAL MEDICINE

## 2022-11-16 ENCOUNTER — OUTSIDE SERVICES (OUTPATIENT)
Dept: PRIMARY CARE CLINIC | Age: 82
End: 2022-11-16
Payer: MEDICARE

## 2022-11-16 DIAGNOSIS — F02.B0 MODERATE LATE ONSET ALZHEIMER'S DEMENTIA WITHOUT BEHAVIORAL DISTURBANCE, PSYCHOTIC DISTURBANCE, MOOD DISTURBANCE, OR ANXIETY (HCC): Primary | ICD-10-CM

## 2022-11-16 DIAGNOSIS — M24.50 JOINT CONTRACTION: ICD-10-CM

## 2022-11-16 DIAGNOSIS — G30.1 MODERATE LATE ONSET ALZHEIMER'S DEMENTIA WITHOUT BEHAVIORAL DISTURBANCE, PSYCHOTIC DISTURBANCE, MOOD DISTURBANCE, OR ANXIETY (HCC): Primary | ICD-10-CM

## 2022-11-16 PROCEDURE — 99307 SBSQ NF CARE SF MDM 10: CPT | Performed by: INTERNAL MEDICINE

## 2022-12-21 ENCOUNTER — OUTSIDE SERVICES (OUTPATIENT)
Dept: PRIMARY CARE CLINIC | Age: 82
End: 2022-12-21

## 2022-12-21 DIAGNOSIS — F02.B0 MODERATE LATE ONSET ALZHEIMER'S DEMENTIA WITHOUT BEHAVIORAL DISTURBANCE, PSYCHOTIC DISTURBANCE, MOOD DISTURBANCE, OR ANXIETY (HCC): Primary | ICD-10-CM

## 2022-12-21 DIAGNOSIS — M24.50 JOINT CONTRACTION: ICD-10-CM

## 2022-12-21 DIAGNOSIS — G30.1 MODERATE LATE ONSET ALZHEIMER'S DEMENTIA WITHOUT BEHAVIORAL DISTURBANCE, PSYCHOTIC DISTURBANCE, MOOD DISTURBANCE, OR ANXIETY (HCC): Primary | ICD-10-CM

## 2023-01-25 ENCOUNTER — OUTSIDE SERVICES (OUTPATIENT)
Dept: PRIMARY CARE CLINIC | Age: 83
End: 2023-01-25

## 2023-01-25 DIAGNOSIS — F02.B0 MODERATE LATE ONSET ALZHEIMER'S DEMENTIA WITHOUT BEHAVIORAL DISTURBANCE, PSYCHOTIC DISTURBANCE, MOOD DISTURBANCE, OR ANXIETY (HCC): Primary | ICD-10-CM

## 2023-01-25 DIAGNOSIS — M24.50 JOINT CONTRACTION: ICD-10-CM

## 2023-01-25 DIAGNOSIS — G30.1 MODERATE LATE ONSET ALZHEIMER'S DEMENTIA WITHOUT BEHAVIORAL DISTURBANCE, PSYCHOTIC DISTURBANCE, MOOD DISTURBANCE, OR ANXIETY (HCC): Primary | ICD-10-CM

## 2023-02-22 ENCOUNTER — OUTSIDE SERVICES (OUTPATIENT)
Dept: PRIMARY CARE CLINIC | Age: 83
End: 2023-02-22

## 2023-02-22 DIAGNOSIS — F02.B0 MODERATE LATE ONSET ALZHEIMER'S DEMENTIA WITHOUT BEHAVIORAL DISTURBANCE, PSYCHOTIC DISTURBANCE, MOOD DISTURBANCE, OR ANXIETY (HCC): Primary | ICD-10-CM

## 2023-02-22 DIAGNOSIS — M24.50 JOINT CONTRACTION: ICD-10-CM

## 2023-02-22 DIAGNOSIS — G30.1 MODERATE LATE ONSET ALZHEIMER'S DEMENTIA WITHOUT BEHAVIORAL DISTURBANCE, PSYCHOTIC DISTURBANCE, MOOD DISTURBANCE, OR ANXIETY (HCC): Primary | ICD-10-CM

## 2023-03-15 ENCOUNTER — OUTSIDE SERVICES (OUTPATIENT)
Dept: PRIMARY CARE CLINIC | Age: 83
End: 2023-03-15

## 2023-03-15 DIAGNOSIS — F02.B0 MODERATE LATE ONSET ALZHEIMER'S DEMENTIA WITHOUT BEHAVIORAL DISTURBANCE, PSYCHOTIC DISTURBANCE, MOOD DISTURBANCE, OR ANXIETY (HCC): Primary | ICD-10-CM

## 2023-03-15 DIAGNOSIS — M24.50 JOINT CONTRACTION: ICD-10-CM

## 2023-03-15 DIAGNOSIS — G30.1 MODERATE LATE ONSET ALZHEIMER'S DEMENTIA WITHOUT BEHAVIORAL DISTURBANCE, PSYCHOTIC DISTURBANCE, MOOD DISTURBANCE, OR ANXIETY (HCC): Primary | ICD-10-CM

## 2023-04-18 ENCOUNTER — OUTSIDE SERVICES (OUTPATIENT)
Dept: PRIMARY CARE CLINIC | Age: 83
End: 2023-04-18

## 2023-04-18 DIAGNOSIS — G30.1 MODERATE LATE ONSET ALZHEIMER'S DEMENTIA WITHOUT BEHAVIORAL DISTURBANCE, PSYCHOTIC DISTURBANCE, MOOD DISTURBANCE, OR ANXIETY (HCC): Primary | ICD-10-CM

## 2023-04-18 DIAGNOSIS — F02.B0 MODERATE LATE ONSET ALZHEIMER'S DEMENTIA WITHOUT BEHAVIORAL DISTURBANCE, PSYCHOTIC DISTURBANCE, MOOD DISTURBANCE, OR ANXIETY (HCC): Primary | ICD-10-CM

## 2023-04-18 DIAGNOSIS — M24.50 JOINT CONTRACTION: ICD-10-CM

## 2023-04-19 NOTE — PROGRESS NOTES
Karuna Izquierdo is a 82 y.o. female non verbal contracted comfortable     Allergies: Patient has no known allergies. Social History     Tobacco Use    Smoking status: Unknown    Smokeless tobacco: Not on file   Substance Use Topics    Alcohol use: Not on file        Review of Systems:  Unobtainable non verbal     Physical Exam:  There were no vitals filed for this visit. Wt Readings from Last 3 Encounters:   10/21/21 100 lb (45.4 kg)       Skin:  Warm and dry. No rash or bruises  HEENT:  PERRLA, EOMI  Neck:  No JVD, No thyromegaly, No carotid bruit  Cardiac:  RRR, No gallop or murmur  Lungs:  CTA, Normal percussion  Abdomen: Normal bowel sounds, no HSM, non-tender  Extremities:  No clubbing, edema or cyanosis  Neurological:  non verbal contraction         Assessment and Plan:    Patient Active Problem List   Diagnosis    Late onset Alzheimer dementia ([facility] [address]    Joint contraction       Diagnosis/Orders  1. Moderate late onset Alzheimer's dementia without behavioral disturbance, psychotic disturbance, mood disturbance, or anxiety (HCC)  2. Joint contraction    Medication reviewed discussed with staff ,stable continue same   No follow-ups on file.       Indiana Sosa MD

## 2023-04-26 ENCOUNTER — OUTSIDE SERVICES (OUTPATIENT)
Dept: PRIMARY CARE CLINIC | Age: 83
End: 2023-04-26

## 2023-04-26 DIAGNOSIS — M24.50 JOINT CONTRACTION: ICD-10-CM

## 2023-04-26 DIAGNOSIS — F02.B0 MODERATE LATE ONSET ALZHEIMER'S DEMENTIA WITHOUT BEHAVIORAL DISTURBANCE, PSYCHOTIC DISTURBANCE, MOOD DISTURBANCE, OR ANXIETY (HCC): Primary | ICD-10-CM

## 2023-04-26 DIAGNOSIS — G30.1 MODERATE LATE ONSET ALZHEIMER'S DEMENTIA WITHOUT BEHAVIORAL DISTURBANCE, PSYCHOTIC DISTURBANCE, MOOD DISTURBANCE, OR ANXIETY (HCC): Primary | ICD-10-CM

## 2023-04-26 NOTE — PROGRESS NOTES
Deni Yo is a 82 y.o. female awake comfortable non verbal       Allergies: Patient has no known allergies. Social History     Tobacco Use    Smoking status: Unknown    Smokeless tobacco: Not on file   Substance Use Topics    Alcohol use: Not on file        Review of Systems:  Patient non verbal     Physical Exam:  VSS   Wt Readings from Last 3 Encounters:   10/21/21 100 lb (45.4 kg)       Skin:  Warm and dry. No rash or bruises  HEENT:  PERRLA, EOMI  Neck:  No JVD, No thyromegaly, No carotid bruit  Cardiac:  RRR, No gallop or murmur  Lungs:  CTA, Normal percussion  Abdomen: Normal bowel sounds, no HSM, non-tender  Extremities:  No clubbing, edema or cyanosis  Neurological: contraction         Assessment and Plan:    Patient Active Problem List   Diagnosis    Late onset Alzheimer dementia ([facility] [address]    Joint contraction       Diagnosis/Orders  1. Moderate late onset Alzheimer's dementia without behavioral disturbance, psychotic disturbance, mood disturbance, or anxiety (HCC)  2. Joint contraction    Stable continue support     No follow-ups on file.       Tiarra Sterling MD

## 2023-05-24 ENCOUNTER — OUTSIDE SERVICES (OUTPATIENT)
Dept: PRIMARY CARE CLINIC | Age: 83
End: 2023-05-24

## 2023-05-24 DIAGNOSIS — G80.0 SPASTIC QUADRIPLEGIC CEREBRAL PALSY (HCC): ICD-10-CM

## 2023-05-24 DIAGNOSIS — F02.B0 MODERATE LATE ONSET ALZHEIMER'S DEMENTIA WITHOUT BEHAVIORAL DISTURBANCE, PSYCHOTIC DISTURBANCE, MOOD DISTURBANCE, OR ANXIETY (HCC): Primary | ICD-10-CM

## 2023-05-24 DIAGNOSIS — G30.1 MODERATE LATE ONSET ALZHEIMER'S DEMENTIA WITHOUT BEHAVIORAL DISTURBANCE, PSYCHOTIC DISTURBANCE, MOOD DISTURBANCE, OR ANXIETY (HCC): Primary | ICD-10-CM

## 2023-05-24 NOTE — PROGRESS NOTES
Admit date: 8/23/18  Date of Service:  5/24/23      Crispin Brock  1940      HPI   She is a 82 y.o. female resident who is being seen today for follow up at [facility]   Awake no new event non verbal staff report no problem . Review of Systems  Unable to obtain non verbal        Objective     Vital Signs: /56 P 60 T 97.8    Physical Exam   Skin:  Warm and dry. No rash or bruises  HEENT:  PERRLA, EOMI  Neck:  No JVD, No thyromegaly, No carotid bruit  Cardiac:  RRR, No gallop or murmur  Lungs:  CTA, Normal percussion  Abdomen: Normal bowel sounds, no HSM, non-tender  Extremities:  No clubbing, edema or cyanosis  Neurological:  contraction     Diagnoses and all orders for this visit:    Moderate late onset Alzheimer's dementia without behavioral disturbance, psychotic disturbance, mood disturbance, or anxiety (HCC)    Spastic quadriplegic cerebral palsy ([address]    Plan : stable continue same management ,nutrition support monitor         Sarah Vargas MD            *Note was creating using voice recognition software.   The document was reviewed however grammatical errors may exist.

## 2023-06-21 ENCOUNTER — OUTSIDE SERVICES (OUTPATIENT)
Dept: PRIMARY CARE CLINIC | Age: 83
End: 2023-06-21

## 2023-06-21 DIAGNOSIS — F02.B0 MODERATE LATE ONSET ALZHEIMER'S DEMENTIA WITHOUT BEHAVIORAL DISTURBANCE, PSYCHOTIC DISTURBANCE, MOOD DISTURBANCE, OR ANXIETY (HCC): Primary | ICD-10-CM

## 2023-06-21 DIAGNOSIS — M62.81 MUSCLE WEAKNESS (GENERALIZED): ICD-10-CM

## 2023-06-21 DIAGNOSIS — G30.1 MODERATE LATE ONSET ALZHEIMER'S DEMENTIA WITHOUT BEHAVIORAL DISTURBANCE, PSYCHOTIC DISTURBANCE, MOOD DISTURBANCE, OR ANXIETY (HCC): Primary | ICD-10-CM

## 2023-06-21 DIAGNOSIS — G80.0 SPASTIC QUADRIPLEGIC CEREBRAL PALSY (HCC): ICD-10-CM

## 2023-06-21 NOTE — PROGRESS NOTES
Admit date: 8/23/18  Date of Service:  6/21/23      Leti Haile  1940      HPI   She is a 82 y.o. female resident who is being seen today for follow up no new event non verbal no change per staff . Review of Systems  Unable to obtain non verbal          Objective     Vital Signs: /66 P 70 R 16 T 97.8    Physical Exam   Skin:  Warm and dry. No rash or bruises  HEENT:  PERRLA, EOMI  Neck:  No JVD, No thyromegaly, No carotid bruit  Cardiac:  RRR, No gallop or murmur  Lungs:  CTA, Normal percussion  Abdomen: Normal bowel sounds, no HSM, non-tender  Extremities:  No clubbing, edema or cyanosis  Neurological:  Moves all extremities, contraction     Diagnoses and all orders for this visit:    Moderate late onset Alzheimer's dementia without behavioral disturbance, psychotic disturbance, mood disturbance, or anxiety (HCC)    Spastic quadriplegic cerebral palsy (HCC)    Muscle weakness (generalized)    Stable to continue same management         Dawson Boston MD            *Note was creating using voice recognition software.   The document was reviewed however grammatical errors may exist.

## 2023-07-19 ENCOUNTER — OUTSIDE SERVICES (OUTPATIENT)
Dept: PRIMARY CARE CLINIC | Age: 83
End: 2023-07-19

## 2023-07-19 DIAGNOSIS — M62.81 MUSCLE WEAKNESS (GENERALIZED): ICD-10-CM

## 2023-07-19 DIAGNOSIS — G40.919 EPILEPSY WITH ALTERED CONSCIOUSNESS WITH INTRACTABLE EPILEPSY (HCC): ICD-10-CM

## 2023-07-19 DIAGNOSIS — G80.0 SPASTIC QUADRIPLEGIC CEREBRAL PALSY (HCC): Primary | ICD-10-CM

## 2023-07-19 NOTE — PROGRESS NOTES
Admit date: 8/23/18  Date of Service:  7/19/23      Alex Members  1940      HPI   She is a 82 y.o. female resident who is being seen today for follow up non verbal contraction comfortable . Review of Systems  Unable to obrain non verbal     current outpatient medications on file. Thylenol elixir  20.3 ml q4 prn   Carbamazepine 100mg/5 ml 30 ml TID     Objective     Vital Signs: /60 P 68 T 97.9 R 18     Physical Exam   Skin:  Warm and dry. 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   The document was reviewed however grammatical errors may exist.

## 2023-08-23 ENCOUNTER — OUTSIDE SERVICES (OUTPATIENT)
Dept: PRIMARY CARE CLINIC | Age: 83
End: 2023-08-23

## 2023-08-23 DIAGNOSIS — F02.B0 MODERATE LATE ONSET ALZHEIMER'S DEMENTIA WITHOUT BEHAVIORAL DISTURBANCE, PSYCHOTIC DISTURBANCE, MOOD DISTURBANCE, OR ANXIETY (HCC): ICD-10-CM

## 2023-08-23 DIAGNOSIS — G30.1 MODERATE LATE ONSET ALZHEIMER'S DEMENTIA WITHOUT BEHAVIORAL DISTURBANCE, PSYCHOTIC DISTURBANCE, MOOD DISTURBANCE, OR ANXIETY (HCC): ICD-10-CM

## 2023-08-23 DIAGNOSIS — M24.50 JOINT CONTRACTION: ICD-10-CM

## 2023-08-23 DIAGNOSIS — G40.919 EPILEPSY WITH ALTERED CONSCIOUSNESS WITH INTRACTABLE EPILEPSY (HCC): ICD-10-CM

## 2023-08-23 DIAGNOSIS — G80.0 SPASTIC QUADRIPLEGIC CEREBRAL PALSY (HCC): Primary | ICD-10-CM

## 2023-09-27 ENCOUNTER — OUTSIDE SERVICES (OUTPATIENT)
Dept: PRIMARY CARE CLINIC | Age: 83
End: 2023-09-27

## 2023-09-27 DIAGNOSIS — G40.919 EPILEPSY WITH ALTERED CONSCIOUSNESS WITH INTRACTABLE EPILEPSY (HCC): ICD-10-CM

## 2023-09-27 DIAGNOSIS — G80.0 SPASTIC QUADRIPLEGIC CEREBRAL PALSY (HCC): Primary | ICD-10-CM

## 2023-09-27 DIAGNOSIS — F02.B0 MODERATE LATE ONSET ALZHEIMER'S DEMENTIA WITHOUT BEHAVIORAL DISTURBANCE, PSYCHOTIC DISTURBANCE, MOOD DISTURBANCE, OR ANXIETY (HCC): ICD-10-CM

## 2023-09-27 DIAGNOSIS — G30.1 MODERATE LATE ONSET ALZHEIMER'S DEMENTIA WITHOUT BEHAVIORAL DISTURBANCE, PSYCHOTIC DISTURBANCE, MOOD DISTURBANCE, OR ANXIETY (HCC): ICD-10-CM

## 2023-09-27 DIAGNOSIS — M24.50 JOINT CONTRACTION: ICD-10-CM

## 2023-09-27 NOTE — PROGRESS NOTES
Admit date: 8/23/18  Date of Service:  9/27/23      Nicole Crawford  1940      HPI   She is a 82 y.o. female resident who is being seen today for follow up of seizures,spastic quadriplegia ,awake non verbal ,comfortable . Review of Systems  Unable to obtain     Medication     Carbamazepine 100mg/5ml 30 ml TID   Tylenol 2 tab. Q4 prn     Objective     Vital Signs: /65 P 67 R 16 T 98.4     Physical Exam   Awake non verbal   Skin:  Warm and dry.   No rash or bruises  HEENT:  PERRLA, EOMI  Neck:  No JVD, No thyromegaly, No carotid bruit  Cardiac:  RRR, No gallop or murmur  Lungs:  CTA, Normal percussion  Abdomen: Normal bowel sounds, no HSM, non-tender  Extremities:  No clubbing, edema or cyanosis  Neurological:  Moves all extremities, contraction upper and lower extremities     Diagnoses and all orders for this visit:    Spastic quadriplegic cerebral palsy (HCC) unchanged ,bed and chair confined ,continue ADL help     Moderate late onset Alzheimer's dementia without behavioral disturbance, psychotic disturbance, mood disturbance, or anxiety ([address])    Joint contraction    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

## 2023-11-15 ENCOUNTER — OUTSIDE SERVICES (OUTPATIENT)
Dept: PRIMARY CARE CLINIC | Age: 83
End: 2023-11-15
Payer: MEDICARE

## 2023-11-15 DIAGNOSIS — F02.B0 MODERATE LATE ONSET ALZHEIMER'S DEMENTIA WITHOUT BEHAVIORAL DISTURBANCE, PSYCHOTIC DISTURBANCE, MOOD DISTURBANCE, OR ANXIETY (HCC): ICD-10-CM

## 2023-11-15 DIAGNOSIS — G40.919 EPILEPSY WITH ALTERED CONSCIOUSNESS WITH INTRACTABLE EPILEPSY (HCC): ICD-10-CM

## 2023-11-15 DIAGNOSIS — G30.1 MODERATE LATE ONSET ALZHEIMER'S DEMENTIA WITHOUT BEHAVIORAL DISTURBANCE, PSYCHOTIC DISTURBANCE, MOOD DISTURBANCE, OR ANXIETY (HCC): ICD-10-CM

## 2023-11-15 DIAGNOSIS — M24.50 JOINT CONTRACTION: ICD-10-CM

## 2023-11-15 DIAGNOSIS — G80.0 SPASTIC QUADRIPLEGIC CEREBRAL PALSY (HCC): Primary | ICD-10-CM

## 2023-11-15 PROCEDURE — 99307 SBSQ NF CARE SF MDM 10: CPT | Performed by: INTERNAL MEDICINE

## 2023-11-15 NOTE — PROGRESS NOTES
Admit date: 8/23/2018   Date of Service:  11/15/23      Lamberto Wilkins  1940      HPI   She is a 82 y.o. female resident who is being seen today for follow up of seizures,awake non verbal .    Discussed with sister     Review of Systems  Non verbal unable to obtain     Medication :    Carbamazepine 100 mg /5 ml  30 ml TID   Tylenol 2 tab. Q 4 prn     Objective     Vital Signs: /70 R 17 P 72 T 97.8     Physical Exam   Skin:  Warm and dry. 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   The document was reviewed however grammatical errors may exist.

## 2024-10-07 ENCOUNTER — HOSPITAL ENCOUNTER (EMERGENCY)
Age: 84
Discharge: HOME | End: 2024-10-07
Attending: EMERGENCY MEDICINE
Payer: MEDICARE

## 2024-10-07 ENCOUNTER — APPOINTMENT (OUTPATIENT)
Dept: GENERAL RADIOLOGY | Age: 84
End: 2024-10-07
Payer: MEDICARE

## 2024-10-07 VITALS
SYSTOLIC BLOOD PRESSURE: 117 MMHG | BODY MASS INDEX: 18.29 KG/M2 | DIASTOLIC BLOOD PRESSURE: 49 MMHG | TEMPERATURE: 97.9 F | OXYGEN SATURATION: 94 % | HEIGHT: 62 IN | HEART RATE: 76 BPM | RESPIRATION RATE: 12 BRPM

## 2024-10-07 DIAGNOSIS — S42.411A CLOSED SUPRACONDYLAR FRACTURE OF RIGHT HUMERUS, INITIAL ENCOUNTER: Primary | ICD-10-CM

## 2024-10-07 PROCEDURE — 73020 X-RAY EXAM OF SHOULDER: CPT

## 2024-10-07 PROCEDURE — 73070 X-RAY EXAM OF ELBOW: CPT

## 2024-10-07 PROCEDURE — 73030 X-RAY EXAM OF SHOULDER: CPT

## 2024-10-07 PROCEDURE — 99283 EMERGENCY DEPT VISIT LOW MDM: CPT

## 2024-10-07 NOTE — ED PROVIDER NOTES
for possible fracture to humerus.  Patient was sent from nursing home.  Patient reportedly had outpatient x-rays which showed concern for fracture.  Patient unable to give a history due to her history of cerebral palsy patient reportedly nonverbal.  Patient has had no history of fall or injury per report.  Patient here is awake alert unable to converse.  Heart exam normal lungs are clear abdomen soft patient is contracted.  Patient pulses are intact her breasts are extended.  Patient on exam does not appear to be uncomfortable.  Patient has a mild swelling to her right arm compared to left.  Patient does not smoke.  Patient  Last seen by primary care doctor on 11/15/2023 at [facility].  Patient differential includes humerus fracture as well as shoulder dislocation as well as sprain.  Patient had imaging done of shoulder as well as her elbow.  Patient x-ray shoulder showed no fracture or dislocation.  Patient x-ray of elbow showed comminuted right humeral supracondylar fracture with associated effusion.  Patient was evaluated by orthopedics here in the emergency department.  Patient did have splint placed by Ortho.  Patient plan will be to discharge back to facility.  Patient will be followed outpatient.     Juan Corona MD  10/07/24 0652

## 2024-10-07 NOTE — DISCHARGE INSTRUCTIONS
XR SHOULDER RIGHT 1 VW   Final Result   Mildly comminuted right humeral supracondylar fracture with associated joint   effusion.         XR ELBOW RIGHT (2 VIEWS)   Final Result   Mildly comminuted right humeral supracondylar fracture with associated joint   effusion.         XR SHOULDER RIGHT (MIN 2 VIEWS)   Final Result   No acute displaced fracture is identified. The humeral head overlaps the   glenoid on all images, which may be due to positioning/technique.  If there   is concern for dislocation, recommend repeat imaging with axillary view.

## 2024-10-07 NOTE — CONSULTS
Department of Orthopedic Surgery  Resident Consult Note          Reason for Consult: Right elbow fracture    HISTORY OF PRESENT ILLNESS:       Patient is a 84 y.o. female with history of cerebral palsy and is nonverbal presented to the ER after found down at her facility.  ER ordered imaging of right shoulder and right elbow which demonstrated supracondylar distal humerus fracture.  Upon initial presentation patient appears to be resting comfortably in bed.  Patient unable to participate in any of the history or physical exam due to nonverbal status.  No family at bedside.    Past Medical History:    History reviewed. No pertinent past medical history.  Past Surgical History:    History reviewed. No pertinent surgical history.  Current Medications:   No current facility-administered medications for this encounter.  Allergies:  Patient has no known allergies.    Social History:   TOBACCO:   has no history on file for tobacco use.  ETOH:   has no history on file for alcohol use.  DRUGS:   has no history on file for drug use.  ACTIVITIES OF DAILY LIVING:    OCCUPATION:    Family History:   History reviewed. No pertinent family history.    REVIEW OF SYSTEMS: Unable to participate in review of systems due to nonverbal status      PHYSICAL EXAM:    VITALS:  BP (!) 117/49   Pulse 76   Temp 97.9 °F (36.6 °C)   Resp 12   Ht 1.575 m (5' 2\")   SpO2 94%   BMI 18.29 kg/m²   CONSTITUTIONAL: Awake but nonverbal tracking with eyes well  MUSCULOSKELETAL:  Right upper Extremity:  +2/4 radial pulse good cap refill extremity warm perfused  Compartments soft and compressible  This is the patient's cerebral palsy arm and is contractile at the wrist and hand and elbow.  Fingers are flexed wrist is extended and elbow is flexed.  These have been contracted for many years and have no motion.  Crepitus felt about the right distal humerus.  No crepitus felt anywhere else on right upper extremity.  Unable to assess motor sensation due to

## 2024-10-18 DIAGNOSIS — S42.401A CLOSED FRACTURE OF DISTAL END OF RIGHT HUMERUS, UNSPECIFIED FRACTURE MORPHOLOGY, INITIAL ENCOUNTER: Primary | ICD-10-CM

## 2024-10-23 ENCOUNTER — OFFICE VISIT (OUTPATIENT)
Dept: ORTHOPEDIC SURGERY | Age: 84
End: 2024-10-23
Payer: MEDICARE

## 2024-10-23 ENCOUNTER — HOSPITAL ENCOUNTER (OUTPATIENT)
Dept: GENERAL RADIOLOGY | Age: 84
Discharge: HOME | End: 2024-10-25
Payer: MEDICARE

## 2024-10-23 DIAGNOSIS — S42.402D CLOSED FRACTURE OF DISTAL END OF LEFT HUMERUS WITH ROUTINE HEALING, UNSPECIFIED FRACTURE MORPHOLOGY, SUBSEQUENT ENCOUNTER: Primary | ICD-10-CM

## 2024-10-23 DIAGNOSIS — S42.401A CLOSED FRACTURE OF DISTAL END OF RIGHT HUMERUS, UNSPECIFIED FRACTURE MORPHOLOGY, INITIAL ENCOUNTER: ICD-10-CM

## 2024-10-23 PROCEDURE — 73060 X-RAY EXAM OF HUMERUS: CPT

## 2024-10-23 RX ORDER — MIRTAZAPINE 15 MG/1
7.5 TABLET, FILM COATED ORAL NIGHTLY
COMMUNITY

## 2024-10-23 RX ORDER — IBUPROFEN 400 MG/1
400 TABLET, FILM COATED ORAL EVERY 12 HOURS
COMMUNITY

## 2024-10-23 RX ORDER — PRIMIDONE 250 MG/1
250 TABLET ORAL 3 TIMES DAILY
COMMUNITY
Start: 2024-10-10

## 2024-10-23 RX ORDER — BISACODYL 10 MG
10 SUPPOSITORY, RECTAL RECTAL DAILY PRN
COMMUNITY

## 2024-10-23 RX ORDER — LEVETIRACETAM 100 MG/ML
10 SOLUTION ORAL 2 TIMES DAILY
COMMUNITY

## 2024-10-23 NOTE — PROGRESS NOTES
Chief Complaint   Patient presents with    ED Follow-up     ED follow up from R distal humerus Fx. Pt ambulating in wheelchair with facility Nurse. Pt is non-verbal.        Subjective:  Palma Bosch is approximately 2 weeks from nonoperative management of right distal humerus fracture.  Patient is nonverbal today.  She is accompanied by a nurse from the skilled nursing facility that she resides in.  She has remained nonweightbearing.  She was placed into a long-arm splint at last orthopedic office visit.  No new complaints from nursing staff.    Review of Systems -  all pertinent positives and negatives in HPI.      Objective:    General: Alert and oriented X 3, normocephalic atraumatic, external ears and eye normal, sclera clear, no acute distress, respirations easy and unlabored with no audible wheezes, skin warm and dry, speech and dress appropriate for noted age, affect euthymic.    Extremity:  Right Upper Extremity  Skin is clean dry and intact  Mild edema noted throughout the distal arm and elbow after splint removal.  Small wound to dorsal hand most likely from rubbing on splint.  No signs of infection.  No other open sores or wounds.  Minimal active range of motion of fingers, thumb, wrist at baseline  Radial pulse palpable, fingers warm with BCR      XR:   2 views of humerus demonstrating distal humerus fracture. No significant change in alignment. No acute fractures or dislocations or any other osseus abnormality identified.    Assessment:   Diagnosis Orders   1. Closed fracture of distal end of left humerus with routine healing, unspecified fracture morphology, subsequent encounter  XR ELBOW RIGHT (MIN 3 VIEWS)          Plan:      Weight Bearing: Non-weight bearing right upper extremity. Patient placed into new long-arm splint today. Call the orthopedic office if splint is too tight or loose, becomes unraveled, becomes wet, or having any issues with skin breakdown.     Pain control: Per facility

## 2024-10-23 NOTE — PATIENT INSTRUCTIONS
INSTRUCTIONS FOR FACILITY      Weight Bearing: Non-weight bearing right upper extremity. Patient placed into new long-arm splint today. Call the orthopedic office if splint is too tight or loose, becomes unraveled, becomes wet, or having any issues with skin breakdown.     Pain control: Per facility physician. Frequent ice, elevation, compression if able.    Follow up in 3-4 weeks    Call with any questions or concerns.   [phone]

## 2024-10-23 NOTE — PROGRESS NOTES
A well padded, long arm posterior splint was applied to his/her Right Upper Extremity.  Neurovascular status was checked pre and post application.  Patient was neurovascularly intact after the application process.  The patient denied any issues with fit or comfort of the cast/splint.  Patient insrtucted to keep cast/splint clean and dry, do not get wet, and NO activities that put them at risk for falling.  Patient instructed to call our office if there are any issues with the cast/splint.      Electronically signed by John Harrell RN on 10/23/2024 at 9:50 AM

## 2024-12-18 ENCOUNTER — HOSPITAL ENCOUNTER (OUTPATIENT)
Dept: GENERAL RADIOLOGY | Age: 84
Discharge: HOME | End: 2024-12-20
Payer: MEDICARE

## 2024-12-18 ENCOUNTER — OFFICE VISIT (OUTPATIENT)
Dept: ORTHOPEDIC SURGERY | Age: 84
End: 2024-12-18
Payer: MEDICARE

## 2024-12-18 VITALS
SYSTOLIC BLOOD PRESSURE: 123 MMHG | RESPIRATION RATE: 14 BRPM | DIASTOLIC BLOOD PRESSURE: 79 MMHG | OXYGEN SATURATION: 92 % | HEART RATE: 60 BPM

## 2024-12-18 DIAGNOSIS — S42.402D CLOSED FRACTURE OF DISTAL END OF LEFT HUMERUS WITH ROUTINE HEALING, UNSPECIFIED FRACTURE MORPHOLOGY, SUBSEQUENT ENCOUNTER: ICD-10-CM

## 2024-12-18 DIAGNOSIS — S42.402D CLOSED FRACTURE OF DISTAL END OF LEFT HUMERUS WITH ROUTINE HEALING, UNSPECIFIED FRACTURE MORPHOLOGY, SUBSEQUENT ENCOUNTER: Primary | ICD-10-CM

## 2024-12-18 PROCEDURE — 1159F MED LIST DOCD IN RCRD: CPT | Performed by: PHYSICIAN ASSISTANT

## 2024-12-18 PROCEDURE — 99212 OFFICE O/P EST SF 10 MIN: CPT | Performed by: PHYSICIAN ASSISTANT

## 2024-12-18 PROCEDURE — 1160F RVW MEDS BY RX/DR IN RCRD: CPT | Performed by: PHYSICIAN ASSISTANT

## 2024-12-18 PROCEDURE — G8419 CALC BMI OUT NRM PARAM NOF/U: HCPCS | Performed by: PHYSICIAN ASSISTANT

## 2024-12-18 PROCEDURE — 1123F ACP DISCUSS/DSCN MKR DOCD: CPT | Performed by: PHYSICIAN ASSISTANT

## 2024-12-18 PROCEDURE — 99213 OFFICE O/P EST LOW 20 MIN: CPT | Performed by: PHYSICIAN ASSISTANT

## 2024-12-18 PROCEDURE — 1090F PRES/ABSN URINE INCON ASSESS: CPT | Performed by: PHYSICIAN ASSISTANT

## 2024-12-18 PROCEDURE — G8484 FLU IMMUNIZE NO ADMIN: HCPCS | Performed by: PHYSICIAN ASSISTANT

## 2024-12-18 PROCEDURE — G8400 PT W/DXA NO RESULTS DOC: HCPCS | Performed by: PHYSICIAN ASSISTANT

## 2024-12-18 PROCEDURE — G8427 DOCREV CUR MEDS BY ELIG CLIN: HCPCS | Performed by: PHYSICIAN ASSISTANT

## 2024-12-18 PROCEDURE — 73080 X-RAY EXAM OF ELBOW: CPT

## 2024-12-18 PROCEDURE — 4004F PT TOBACCO SCREEN RCVD TLK: CPT | Performed by: PHYSICIAN ASSISTANT

## 2024-12-18 RX ORDER — CARBAMAZEPINE 200 MG/1
800 TABLET ORAL 3 TIMES DAILY
COMMUNITY
Start: 2024-12-05

## 2024-12-18 NOTE — PROGRESS NOTES
Chief Complaint   Patient presents with    Follow-up     Pt follow up from R distal humerus fx non-op. Pt ambulating in wheelchair. Pt unable to state pain        DOI 10/7/24      Subjective:  Palma Bosch is approximately 10 weeks follow-up from the above surgery.  She is nonverbal and unable to provide any history.  Here today with family.  Resides in skilled nursing facility.  Has maintained a splint to the right upper extremity.    Review of Systems -  all pertinent positives and negatives in HPI.      Objective:    General: Alert and oriented X 3, normocephalic atraumatic, external ears and eye normal, sclera clear, no acute distress, respirations easy and unlabored with no audible wheezes, skin warm and dry, speech and dress appropriate for noted age, affect euthymic.    Extremity:  Right Upper Extremity  After splint removal today, there is a circular wound to the lateral aspect of the right elbow with mild wound slough.  No significant surrounding warmth or erythema.  No drainage.  Radial pulse palpable, fingers warm with BCR  Elbow rests in flexion        Vitals:    12/18/24 1025   BP: 123/79   Site: Left Upper Arm   Position: Sitting   Cuff Size: Medium Adult   Pulse: 60   Resp: 14   SpO2: 92%         XR:   3 views of R elbow demonstrating mild interval healing of the distal humerus.  No significant change in alignment. No acute fractures or dislocations or any other osseus abnormality identified.    Assessment:   Diagnosis Orders   1. Closed fracture of distal end of left humerus with routine healing, unspecified fracture morphology, subsequent encounter [T55.624R]            Plan:      Weight Bearing: Non-weight bearing right upper extremity.     Pain control: Per facility physician. Frequent ice, elevation, compression if able.    Wound Care: Twice daily wet-to-dry dressings over the circular lateral right elbow wound.  Wrapped with ABD pads for cushioning with Ace bandage.  Change morning and evening

## 2024-12-18 NOTE — PATIENT INSTRUCTIONS
INSTRUCTIONS FOR FACILITY      Weight Bearing: Non-weight bearing right upper extremity.     Pain control: Per facility physician. Frequent ice, elevation, compression if able.    Wound Care: Twice daily wet-to-dry dressings over the circular lateral right elbow wound.  Wrapped with ABD pads for cushioning with Ace bandage.  Change morning and evening with new wet-to-dry dressings.    Recommend starting Bactrim DS twice daily x 10 days.  Continue to monitor for signs and symptoms of infection and call the orthopedic office if conditions change or do not continue to improve with dressing changes and antibiotics.    Follow up in 2 weeks    Call with any questions or concerns.   [phone]

## 2025-01-22 ENCOUNTER — TELEPHONE (OUTPATIENT)
Dept: ORTHOPEDIC SURGERY | Age: 85
End: 2025-01-22

## 2025-01-22 NOTE — TELEPHONE ENCOUNTER
Returned call to Carolee who stated wound was healed. Relayed information regarding follow up and patient was rescheduled for 2/5/25 at 10:30.

## 2025-01-22 NOTE — TELEPHONE ENCOUNTER
Carolee, [facility] [place], stated family/POA did not want patient to come to appointment 1/29/25 due to weather if only doing wound check, her elbow was healed.    Discussed with Keyon Miller PA-C who stated the office would like to see her for repeat imaging to determine status of healing.  Can f/u in 2-3 weeks only if wound is verified by facility to be healed, otherwise need to keep appointment.

## 2025-02-12 ENCOUNTER — OFFICE VISIT (OUTPATIENT)
Dept: ORTHOPEDIC SURGERY | Age: 85
End: 2025-02-12
Payer: MEDICARE

## 2025-02-12 DIAGNOSIS — S42.402D CLOSED FRACTURE OF DISTAL END OF LEFT HUMERUS WITH ROUTINE HEALING, UNSPECIFIED FRACTURE MORPHOLOGY, SUBSEQUENT ENCOUNTER: Primary | ICD-10-CM

## 2025-02-12 PROCEDURE — 1123F ACP DISCUSS/DSCN MKR DOCD: CPT | Performed by: PHYSICIAN ASSISTANT

## 2025-02-12 PROCEDURE — 4004F PT TOBACCO SCREEN RCVD TLK: CPT | Performed by: PHYSICIAN ASSISTANT

## 2025-02-12 PROCEDURE — 99213 OFFICE O/P EST LOW 20 MIN: CPT | Performed by: PHYSICIAN ASSISTANT

## 2025-02-12 PROCEDURE — G8400 PT W/DXA NO RESULTS DOC: HCPCS | Performed by: PHYSICIAN ASSISTANT

## 2025-02-12 PROCEDURE — 99212 OFFICE O/P EST SF 10 MIN: CPT | Performed by: PHYSICIAN ASSISTANT

## 2025-02-12 PROCEDURE — G8427 DOCREV CUR MEDS BY ELIG CLIN: HCPCS | Performed by: PHYSICIAN ASSISTANT

## 2025-02-12 PROCEDURE — G8419 CALC BMI OUT NRM PARAM NOF/U: HCPCS | Performed by: PHYSICIAN ASSISTANT

## 2025-02-12 PROCEDURE — 1090F PRES/ABSN URINE INCON ASSESS: CPT | Performed by: PHYSICIAN ASSISTANT

## 2025-02-12 RX ORDER — DIAZEPAM 10 MG/2ML
5 INJECTION, SOLUTION INTRAMUSCULAR; INTRAVENOUS EVERY 5 MIN PRN
COMMUNITY

## 2025-02-12 NOTE — PROGRESS NOTES
Chief Complaint   Patient presents with    Follow-up      DOI: 10/7/2024 Right supracondylar intra-articular distal humerus fracture; TTS Unable to get Vital signs         SUBJECTIVE: Palma Bosch is an 85-year-old female who presents for follow-up for a right distal humerus fracture treated nonoperatively sustained 10/7/2024.  She is now 4+ months out from injury.  Resides at the skilled nursing facility.  She is nonverbal and unable to provide any history.  Presents with her guardian who is a good historian.  He states the wound to her right elbow is healed.  She presents in a motorized wheelchair, Buffy lift.  He states that the patient does not complain of any pain to the elbow or arm.    Review of Systems -   General ROS: negative for - chills, fatigue, fever or night sweats  Respiratory ROS: no cough, shortness of breath, or wheezing  Cardiovascular ROS: no chest pain or dyspnea on exertion  Gastrointestinal ROS: no abdominal pain, change in bowel habits, or black or bloody stools  Genitourinary: no hematuria, dysuria, or incontinence   Musculoskeletal ROS:see above  Neurological ROS: no TIA or stroke symptoms     OBJECTIVE:   Alert and oriented X 3, no acute distress, respirations easy and unlabored with no audible wheezes, skin warm and dry, speech and dress appropriate for noted age, affect euthymic.    Extremity:  Right Upper Extremity  Skin is clean dry and intact  no edema noted  Right elbow wound is healed with scar formation.  No skin opening, redness or swelling.  She does have chronic contractures to the elbow, wrist and digits.  She presents in a motorized wheelchair.  She does not follow commands to move the arm although her guardian states that she is able to move the arm/elbow  Subjectively states sensation intact to radial/medial/ulnar distribution    XR: 2/12/25   Not taken today.    There were no vitals taken for this visit.    ASSESSMENT:   Diagnosis Orders   1. Closed fracture of distal end

## 2025-02-12 NOTE — PATIENT INSTRUCTIONS
Activities as tolerated with the right arm.    Follow-up as needed.    Call if any questions or concerns